# Patient Record
Sex: MALE | Race: OTHER | Employment: OTHER | URBAN - METROPOLITAN AREA
[De-identification: names, ages, dates, MRNs, and addresses within clinical notes are randomized per-mention and may not be internally consistent; named-entity substitution may affect disease eponyms.]

---

## 2017-01-19 ENCOUNTER — OFFICE VISIT (OUTPATIENT)
Dept: INTERNAL MEDICINE CLINIC | Facility: CLINIC | Age: 67
End: 2017-01-19

## 2017-01-19 VITALS
RESPIRATION RATE: 16 BRPM | HEART RATE: 78 BPM | HEIGHT: 70 IN | WEIGHT: 164.13 LBS | DIASTOLIC BLOOD PRESSURE: 82 MMHG | BODY MASS INDEX: 23.5 KG/M2 | SYSTOLIC BLOOD PRESSURE: 118 MMHG

## 2017-01-19 DIAGNOSIS — Z12.5 SCREENING FOR PROSTATE CANCER: ICD-10-CM

## 2017-01-19 DIAGNOSIS — Z79.01 LONG TERM (CURRENT) USE OF ANTICOAGULANTS: ICD-10-CM

## 2017-01-19 DIAGNOSIS — E78.2 MIXED HYPERLIPIDEMIA: ICD-10-CM

## 2017-01-19 DIAGNOSIS — I42.9 CARDIOMYOPATHY, UNSPECIFIED TYPE (HCC): ICD-10-CM

## 2017-01-19 DIAGNOSIS — Z00.00 MEDICARE ANNUAL WELLNESS VISIT, SUBSEQUENT: Primary | ICD-10-CM

## 2017-01-19 DIAGNOSIS — I48.92 ATRIAL FLUTTER, UNSPECIFIED TYPE (HCC): ICD-10-CM

## 2017-01-19 DIAGNOSIS — Z13.31 DEPRESSION SCREENING: ICD-10-CM

## 2017-01-19 PROCEDURE — G0444 DEPRESSION SCREEN ANNUAL: HCPCS | Performed by: INTERNAL MEDICINE

## 2017-01-19 PROCEDURE — G0102 PROSTATE CA SCREENING; DRE: HCPCS | Performed by: INTERNAL MEDICINE

## 2017-01-19 PROCEDURE — 99214 OFFICE O/P EST MOD 30 MIN: CPT | Performed by: INTERNAL MEDICINE

## 2017-01-19 PROCEDURE — 96160 PT-FOCUSED HLTH RISK ASSMT: CPT | Performed by: INTERNAL MEDICINE

## 2017-01-19 PROCEDURE — G0463 HOSPITAL OUTPT CLINIC VISIT: HCPCS | Performed by: INTERNAL MEDICINE

## 2017-01-19 NOTE — PROGRESS NOTES
HPI:   Laura Esquivel is a 74year old male who presents for a Medicare Subsequent Annual Wellness visit (Pt already had Initial Annual Wellness).     Annual Physical due on 01/19/2018        Patient Care Team: Patient Care Team:  Zeeshan Jones MD Screening Method:  Questionnaire      I have a problem hearing over the telephone:  No I have trouble following   the conversations when two or more people are talking at the same time:    No   I have trouble understanding things on the TV:  No I have to Pneumococcal?: No     Functional Ability     Bathing or Showering: Able without help    Toileting: Able without help    Dressing: Able without help    Eating: Able without help    Driving: Able without help    Preparing your meals: Able without help    Man patient  PREVENTATIVE SERVICES  INDICATIONS AND SCHEDULE Internal Lab or Procedure External Lab or Procedure   Diabetes Screening      HbgA1C   Annually GLYCOHEMOGLOBIN (HGA1C) (L) (%)   Date Value   06/06/2015 5.8       No flowsheet data found.     Fasting Drug Serum Conc  Annually No results found for: DIGOXIN, DIG, VALP No flowsheet data found. Diabetes      HgbA1C  Annually GLYCOHEMOGLOBIN (HGA1C) (L) (%)   Date Value   06/06/2015 5.8       No flowsheet data found.     Creat/alb ratio  Annually psychosocial issues. Risk factors for coronary artery disease include male sex. Review of Systems   Constitutional: Negative for fever, chills and unexpected weight change. HENT: Negative for congestion, ear pain and postnasal drip.     Eyes: Negat Soft. Bowel sounds are normal. There is no tenderness. There is no guarding. Genitourinary: Rectum normal, prostate normal, testes normal and penis normal. Rectal exam shows no mass, no tenderness and anal tone normal. Prostate is not tender.  Right testi hyperlipidemia  His last lipid panel done in January 2016 was very good, however he was taking Lipitor at that time. He stopped taking the Lipitor 2 months ago and he will not let me refill it at this time. He wants to do a blood test and see Dr. Tamie Loera.

## 2017-01-19 NOTE — PATIENT INSTRUCTIONS
Do fasting labs this weekend. Fast for 12 hours. Water is okay. Walk in. Recommended Websites for Advanced Directives    [email or web address]  An information packet, including necessary form from the [place]

## 2017-01-21 ENCOUNTER — LAB ENCOUNTER (OUTPATIENT)
Dept: LAB | Facility: HOSPITAL | Age: 67
End: 2017-01-21
Attending: INTERNAL MEDICINE
Payer: MEDICARE

## 2017-01-21 ENCOUNTER — PRIOR ORIGINAL RECORDS (OUTPATIENT)
Dept: OTHER | Age: 67
End: 2017-01-21

## 2017-01-21 DIAGNOSIS — I48.19 PERSISTENT ATRIAL FIBRILLATION (HCC): Primary | ICD-10-CM

## 2017-01-21 DIAGNOSIS — Z12.5 SCREENING FOR PROSTATE CANCER: ICD-10-CM

## 2017-01-21 DIAGNOSIS — I48.92 ATRIAL FLUTTER, UNSPECIFIED TYPE (HCC): ICD-10-CM

## 2017-01-21 DIAGNOSIS — E78.2 MIXED HYPERLIPIDEMIA: ICD-10-CM

## 2017-01-21 LAB
ALBUMIN SERPL BCP-MCNC: 3.9 G/DL (ref 3.5–4.8)
ALBUMIN/GLOB SERPL: 1.2 {RATIO} (ref 1–2)
ALP SERPL-CCNC: 60 U/L (ref 32–100)
ALT SERPL-CCNC: 19 U/L (ref 17–63)
ANION GAP SERPL CALC-SCNC: 6 MMOL/L (ref 0–18)
AST SERPL-CCNC: 25 U/L (ref 15–41)
BASOPHILS # BLD: 0.1 K/UL (ref 0–0.2)
BASOPHILS NFR BLD: 1 %
BILIRUB SERPL-MCNC: 1.5 MG/DL (ref 0.3–1.2)
BUN SERPL-MCNC: 27 MG/DL (ref 8–20)
BUN/CREAT SERPL: 27.3 (ref 10–20)
CALCIUM SERPL-MCNC: 9.5 MG/DL (ref 8.5–10.5)
CHLORIDE SERPL-SCNC: 107 MMOL/L (ref 95–110)
CHOLEST SERPL-MCNC: 164 MG/DL (ref 110–200)
CO2 SERPL-SCNC: 29 MMOL/L (ref 22–32)
CREAT SERPL-MCNC: 0.99 MG/DL (ref 0.5–1.5)
EOSINOPHIL # BLD: 0.3 K/UL (ref 0–0.7)
EOSINOPHIL NFR BLD: 7 %
ERYTHROCYTE [DISTWIDTH] IN BLOOD BY AUTOMATED COUNT: 15.8 % (ref 11–15)
GLOBULIN PLAS-MCNC: 3.2 G/DL (ref 2.5–3.7)
GLUCOSE SERPL-MCNC: 91 MG/DL (ref 70–99)
HCT VFR BLD AUTO: 49 % (ref 41–52)
HDLC SERPL-MCNC: 36 MG/DL
HGB BLD-MCNC: 16.2 G/DL (ref 13.5–17.5)
INR BLD: 2.3 (ref 0.9–1.2)
LDLC SERPL CALC-MCNC: 115 MG/DL (ref 0–99)
LYMPHOCYTES # BLD: 1.1 K/UL (ref 1–4)
LYMPHOCYTES NFR BLD: 23 %
MCH RBC QN AUTO: 32.1 PG (ref 27–32)
MCHC RBC AUTO-ENTMCNC: 33 G/DL (ref 32–37)
MCV RBC AUTO: 97.4 FL (ref 80–100)
MONOCYTES # BLD: 0.5 K/UL (ref 0–1)
MONOCYTES NFR BLD: 9 %
NEUTROPHILS # BLD AUTO: 2.8 K/UL (ref 1.8–7.7)
NEUTROPHILS NFR BLD: 59 %
NONHDLC SERPL-MCNC: 128 MG/DL
OSMOLALITY UR CALC.SUM OF ELEC: 299 MOSM/KG (ref 275–295)
PLATELET # BLD AUTO: 190 K/UL (ref 140–400)
PMV BLD AUTO: 11 FL (ref 7.4–10.3)
POTASSIUM SERPL-SCNC: 4.4 MMOL/L (ref 3.3–5.1)
PROT SERPL-MCNC: 7.1 G/DL (ref 5.9–8.4)
PROTHROMBIN TIME: 25.2 SECONDS (ref 11.8–14.5)
PSA SERPL-MCNC: 1.5 NG/ML (ref 0–4)
RBC # BLD AUTO: 5.03 M/UL (ref 4.5–5.9)
SODIUM SERPL-SCNC: 142 MMOL/L (ref 136–144)
TRIGL SERPL-MCNC: 63 MG/DL (ref 1–149)
TSH SERPL-ACNC: 0.86 UIU/ML (ref 0.34–5.6)
WBC # BLD AUTO: 4.8 K/UL (ref 4–11)

## 2017-01-21 PROCEDURE — 36415 COLL VENOUS BLD VENIPUNCTURE: CPT

## 2017-01-21 PROCEDURE — 85610 PROTHROMBIN TIME: CPT

## 2017-01-21 PROCEDURE — 80061 LIPID PANEL: CPT

## 2017-01-21 PROCEDURE — 85025 COMPLETE CBC W/AUTO DIFF WBC: CPT

## 2017-01-21 PROCEDURE — 84443 ASSAY THYROID STIM HORMONE: CPT

## 2017-01-21 PROCEDURE — 80053 COMPREHEN METABOLIC PANEL: CPT

## 2017-01-25 PROBLEM — Z79.01 LONG TERM (CURRENT) USE OF ANTICOAGULANTS: Status: ACTIVE | Noted: 2017-01-25

## 2017-03-28 LAB
ALBUMIN: 3.9 G/DL
ALT (SGPT): 19 U/L
ALT (SGPT): 19 U/L
AST (SGOT): 25 U/L
AST (SGOT): 25 U/L
BILIRUBIN TOTAL: 1.5 MG/DL
BILIRUBIN TOTAL: 1.5 MG/DL
BUN: 27 MG/DL
CALCIUM: 9.5 MG/DL
CHLORIDE: 107 MEQ/L
CHOLESTEROL, TOTAL: 164 MG/DL
CHOLESTEROL, TOTAL: 164 MG/DL
CREATININE, SERUM: 0.99 MG/DL
GLOBULIN: 3.2 G/DL
GLUCOSE: 91 MG/DL
HDL CHOLESTEROL: 36 MG/DL
HDL CHOLESTEROL: 36 MG/DL
HEMATOCRIT: 49 %
HEMOGLOBIN: 16.2 G/DL
LDL CHOLESTEROL: 115 MG/DL
LDL CHOLESTEROL: 115 MG/DL
MCH: 32.1 PG
MCHC: 33 G/DL
MCV: 97.4 FL
NON-HDL CHOLESTEROL: 128 MG/DL
NON-HDL CHOLESTEROL: 128 MG/DL
PLATELETS: 190 K/UL
POTASSIUM, SERUM: 4.4 MEQ/L
PROTEIN, TOTAL: 7.1 G/DL
RED BLOOD COUNT: 5.03 X 10-6/U
SGOT (AST): 25 IU/L
SGPT (ALT): 19 IU/L
SODIUM: 142 MEQ/L
THYROID STIMULATING HORMONE: 0.86 MLU/L
TRIGLYCERIDES: 63 MG/DL
TRIGLYCERIDES: 63 MG/DL
WHITE BLOOD COUNT: 4.8 X 10-3/U

## 2017-04-03 ENCOUNTER — APPOINTMENT (OUTPATIENT)
Dept: LAB | Age: 67
End: 2017-04-03
Attending: INTERNAL MEDICINE
Payer: MEDICARE

## 2017-04-03 DIAGNOSIS — I48.92 ATRIAL FLUTTER, UNSPECIFIED TYPE (HCC): ICD-10-CM

## 2017-04-03 DIAGNOSIS — E78.2 MIXED HYPERLIPIDEMIA: ICD-10-CM

## 2017-04-03 DIAGNOSIS — I48.19 PERSISTENT ATRIAL FIBRILLATION (HCC): ICD-10-CM

## 2017-04-03 DIAGNOSIS — Z12.5 SCREENING FOR PROSTATE CANCER: ICD-10-CM

## 2017-04-03 PROCEDURE — 85610 PROTHROMBIN TIME: CPT

## 2017-04-03 PROCEDURE — 36415 COLL VENOUS BLD VENIPUNCTURE: CPT

## 2017-04-04 ENCOUNTER — PRIOR ORIGINAL RECORDS (OUTPATIENT)
Dept: OTHER | Age: 67
End: 2017-04-04

## 2017-05-24 ENCOUNTER — APPOINTMENT (OUTPATIENT)
Dept: LAB | Age: 67
End: 2017-05-24
Attending: INTERNAL MEDICINE
Payer: MEDICARE

## 2017-05-24 DIAGNOSIS — I48.19 PERSISTENT ATRIAL FIBRILLATION (HCC): ICD-10-CM

## 2017-05-24 DIAGNOSIS — I48.92 ATRIAL FLUTTER, UNSPECIFIED TYPE (HCC): ICD-10-CM

## 2017-05-24 DIAGNOSIS — Z12.5 SCREENING FOR PROSTATE CANCER: ICD-10-CM

## 2017-05-24 DIAGNOSIS — E78.2 MIXED HYPERLIPIDEMIA: ICD-10-CM

## 2017-05-24 PROCEDURE — 36415 COLL VENOUS BLD VENIPUNCTURE: CPT

## 2017-05-24 PROCEDURE — 85610 PROTHROMBIN TIME: CPT

## 2017-07-11 ENCOUNTER — APPOINTMENT (OUTPATIENT)
Dept: LAB | Age: 67
End: 2017-07-11
Attending: INTERNAL MEDICINE
Payer: MEDICARE

## 2017-07-11 DIAGNOSIS — Z12.5 SCREENING FOR PROSTATE CANCER: ICD-10-CM

## 2017-07-11 DIAGNOSIS — I48.19 PERSISTENT ATRIAL FIBRILLATION (HCC): ICD-10-CM

## 2017-07-11 DIAGNOSIS — E78.2 MIXED HYPERLIPIDEMIA: ICD-10-CM

## 2017-07-11 DIAGNOSIS — I48.92 ATRIAL FLUTTER, UNSPECIFIED TYPE (HCC): ICD-10-CM

## 2017-07-11 LAB
INR BLD: 2.2 (ref 0.9–1.2)
PROTHROMBIN TIME: 24.2 SECONDS (ref 11.8–14.5)

## 2017-07-11 PROCEDURE — 36415 COLL VENOUS BLD VENIPUNCTURE: CPT

## 2017-07-11 PROCEDURE — 85610 PROTHROMBIN TIME: CPT

## 2017-08-25 ENCOUNTER — HOSPITAL ENCOUNTER (OUTPATIENT)
Dept: CARDIOLOGY CLINIC | Age: 67
Discharge: HOME | End: 2017-08-25
Attending: INTERNAL MEDICINE
Payer: MEDICARE

## 2017-08-25 DIAGNOSIS — I42.9 CARDIOMYOPATHY, UNSPECIFIED TYPE (HCC): ICD-10-CM

## 2017-08-25 PROCEDURE — 93306 TTE W/DOPPLER COMPLETE: CPT | Performed by: INTERNAL MEDICINE

## 2017-08-28 ENCOUNTER — OFFICE VISIT (OUTPATIENT)
Dept: INTERNAL MEDICINE CLINIC | Facility: CLINIC | Age: 67
End: 2017-08-28

## 2017-08-28 VITALS
HEIGHT: 69.5 IN | WEIGHT: 161.81 LBS | OXYGEN SATURATION: 97 % | BODY MASS INDEX: 23.43 KG/M2 | RESPIRATION RATE: 18 BRPM | TEMPERATURE: 98 F | HEART RATE: 84 BPM | SYSTOLIC BLOOD PRESSURE: 122 MMHG | DIASTOLIC BLOOD PRESSURE: 80 MMHG

## 2017-08-28 DIAGNOSIS — Z23 NEED FOR PNEUMOCOCCAL VACCINE: ICD-10-CM

## 2017-08-28 DIAGNOSIS — I48.91 ATRIAL FIBRILLATION AND FLUTTER (HCC): ICD-10-CM

## 2017-08-28 DIAGNOSIS — E78.00 PURE HYPERCHOLESTEROLEMIA: Primary | ICD-10-CM

## 2017-08-28 DIAGNOSIS — I48.92 ATRIAL FIBRILLATION AND FLUTTER (HCC): ICD-10-CM

## 2017-08-28 DIAGNOSIS — Z12.5 SCREENING FOR PROSTATE CANCER: ICD-10-CM

## 2017-08-28 PROCEDURE — 99204 OFFICE O/P NEW MOD 45 MIN: CPT | Performed by: INTERNAL MEDICINE

## 2017-08-28 NOTE — PROGRESS NOTES
HPI:    Patient ID: Devon Webb is a 74year old male. HPIPt here for a get established as a new pt. Has a fib and hx of stroke a few years. Needs blood work orders. Review of Systems   Constitutional: Negative for unexpected weight change.

## 2017-08-29 ENCOUNTER — PRIOR ORIGINAL RECORDS (OUTPATIENT)
Dept: OTHER | Age: 67
End: 2017-08-29

## 2017-08-30 ENCOUNTER — PRIOR ORIGINAL RECORDS (OUTPATIENT)
Dept: OTHER | Age: 67
End: 2017-08-30

## 2017-08-30 ENCOUNTER — LAB ENCOUNTER (OUTPATIENT)
Dept: LAB | Age: 67
End: 2017-08-30
Attending: INTERNAL MEDICINE
Payer: MEDICARE

## 2017-08-30 DIAGNOSIS — I48.92 ATRIAL FIBRILLATION AND FLUTTER (HCC): ICD-10-CM

## 2017-08-30 DIAGNOSIS — I48.91 ATRIAL FIBRILLATION AND FLUTTER (HCC): ICD-10-CM

## 2017-08-30 DIAGNOSIS — Z12.5 SCREENING FOR PROSTATE CANCER: ICD-10-CM

## 2017-08-30 DIAGNOSIS — E78.00 PURE HYPERCHOLESTEROLEMIA: ICD-10-CM

## 2017-08-30 LAB
ALBUMIN SERPL BCP-MCNC: 4.2 G/DL (ref 3.5–4.8)
ALBUMIN/GLOB SERPL: 1.4 {RATIO} (ref 1–2)
ALP SERPL-CCNC: 62 U/L (ref 32–100)
ALT SERPL-CCNC: 19 U/L (ref 17–63)
ANION GAP SERPL CALC-SCNC: 7 MMOL/L (ref 0–18)
AST SERPL-CCNC: 26 U/L (ref 15–41)
BASOPHILS # BLD: 0 K/UL (ref 0–0.2)
BASOPHILS NFR BLD: 1 %
BILIRUB SERPL-MCNC: 2.2 MG/DL (ref 0.3–1.2)
BUN SERPL-MCNC: 20 MG/DL (ref 8–20)
BUN/CREAT SERPL: 18.5 (ref 10–20)
CALCIUM SERPL-MCNC: 9.9 MG/DL (ref 8.5–10.5)
CHLORIDE SERPL-SCNC: 106 MMOL/L (ref 95–110)
CHOLEST SERPL-MCNC: 157 MG/DL (ref 110–200)
CO2 SERPL-SCNC: 29 MMOL/L (ref 22–32)
CREAT SERPL-MCNC: 1.08 MG/DL (ref 0.5–1.5)
EOSINOPHIL # BLD: 0.4 K/UL (ref 0–0.7)
EOSINOPHIL NFR BLD: 6 %
ERYTHROCYTE [DISTWIDTH] IN BLOOD BY AUTOMATED COUNT: 16.1 % (ref 11–15)
GLOBULIN PLAS-MCNC: 3 G/DL (ref 2.5–3.7)
GLUCOSE SERPL-MCNC: 79 MG/DL (ref 70–99)
HCT VFR BLD AUTO: 52.4 % (ref 41–52)
HDLC SERPL-MCNC: 34 MG/DL
HGB BLD-MCNC: 17.4 G/DL (ref 13.5–17.5)
LDLC SERPL CALC-MCNC: 106 MG/DL (ref 0–99)
LYMPHOCYTES # BLD: 1.6 K/UL (ref 1–4)
LYMPHOCYTES NFR BLD: 25 %
MCH RBC QN AUTO: 32 PG (ref 27–32)
MCHC RBC AUTO-ENTMCNC: 33.1 G/DL (ref 32–37)
MCV RBC AUTO: 96.6 FL (ref 80–100)
MONOCYTES # BLD: 0.6 K/UL (ref 0–1)
MONOCYTES NFR BLD: 10 %
NEUTROPHILS # BLD AUTO: 3.7 K/UL (ref 1.8–7.7)
NEUTROPHILS NFR BLD: 59 %
NONHDLC SERPL-MCNC: 123 MG/DL
OSMOLALITY UR CALC.SUM OF ELEC: 296 MOSM/KG (ref 275–295)
PLATELET # BLD AUTO: 190 K/UL (ref 140–400)
PMV BLD AUTO: 10.4 FL (ref 7.4–10.3)
POTASSIUM SERPL-SCNC: 5.7 MMOL/L (ref 3.3–5.1)
PROT SERPL-MCNC: 7.2 G/DL (ref 5.9–8.4)
PSA SERPL-MCNC: 1.4 NG/ML (ref 0–4)
RBC # BLD AUTO: 5.42 M/UL (ref 4.5–5.9)
SODIUM SERPL-SCNC: 142 MMOL/L (ref 136–144)
TRIGL SERPL-MCNC: 86 MG/DL (ref 1–149)
WBC # BLD AUTO: 6.3 K/UL (ref 4–11)

## 2017-08-30 PROCEDURE — 80053 COMPREHEN METABOLIC PANEL: CPT

## 2017-08-30 PROCEDURE — 80061 LIPID PANEL: CPT

## 2017-08-30 PROCEDURE — 85025 COMPLETE CBC W/AUTO DIFF WBC: CPT

## 2017-08-30 PROCEDURE — 36415 COLL VENOUS BLD VENIPUNCTURE: CPT

## 2017-09-01 LAB
ALBUMIN: 4.2 G/DL
ALKALINE PHOSPHATATE(ALK PHOS): 62 IU/L
ALT (SGPT): 19 U/L
AST (SGOT): 26 U/L
BILIRUBIN TOTAL: 2.2 MG/DL
BUN: 20 MG/DL
CALCIUM: 9.9 MG/DL
CHLORIDE: 106 MEQ/L
CHOLESTEROL, TOTAL: 157 MG/DL
CREATININE, SERUM: 1.08 MG/DL
GLOBULIN: 3 G/DL
GLUCOSE: 79 MG/DL
GLUCOSE: 79 MG/DL
HDL CHOLESTEROL: 34 MG/DL
HEMATOCRIT: 52.4 %
HEMOGLOBIN: 17.4 G/DL
LDL CHOLESTEROL: 106 MG/DL
NON-HDL CHOLESTEROL: 123 MG/DL
PLATELETS: 190 K/UL
POTASSIUM, SERUM: 5.7 MEQ/L
PROTEIN, TOTAL: 7.2 G/DL
RED BLOOD COUNT: 5.42 X 10-6/U
SGOT (AST): 26 IU/L
SGPT (ALT): 19 IU/L
SODIUM: 142 MEQ/L
TRIGLYCERIDES: 86 MG/DL
WHITE BLOOD COUNT: 6.3 X 10-3/U

## 2017-09-05 ENCOUNTER — PRIOR ORIGINAL RECORDS (OUTPATIENT)
Dept: OTHER | Age: 67
End: 2017-09-05

## 2017-09-06 NOTE — PROGRESS NOTES
Pt notified normal rslts per Dr Josephine Hughes- released to [facility]

## 2017-10-18 ENCOUNTER — OFFICE VISIT (OUTPATIENT)
Dept: INTERNAL MEDICINE CLINIC | Facility: CLINIC | Age: 67
End: 2017-10-18

## 2017-10-18 VITALS
WEIGHT: 166.63 LBS | OXYGEN SATURATION: 97 % | DIASTOLIC BLOOD PRESSURE: 74 MMHG | SYSTOLIC BLOOD PRESSURE: 124 MMHG | HEART RATE: 66 BPM | BODY MASS INDEX: 24.12 KG/M2 | RESPIRATION RATE: 17 BRPM | TEMPERATURE: 98 F | HEIGHT: 69.5 IN

## 2017-10-18 DIAGNOSIS — I42.0 DILATED CARDIOMYOPATHY (HCC): ICD-10-CM

## 2017-10-18 DIAGNOSIS — Z23 INFLUENZA VACCINE NEEDED: Primary | ICD-10-CM

## 2017-10-18 DIAGNOSIS — I48.19 PERSISTENT ATRIAL FIBRILLATION (HCC): ICD-10-CM

## 2017-10-18 PROCEDURE — 99214 OFFICE O/P EST MOD 30 MIN: CPT | Performed by: INTERNAL MEDICINE

## 2017-10-18 RX ORDER — RIVAROXABAN 20 MG/1
20 TABLET, FILM COATED ORAL
COMMUNITY
Start: 2017-10-02

## 2017-10-18 RX ORDER — CARVEDILOL 6.25 MG/1
6.25 TABLET ORAL 2 TIMES DAILY WITH MEALS
COMMUNITY
Start: 2017-10-02 | End: 2018-08-29

## 2017-10-18 NOTE — PROGRESS NOTES
HPI:    Patient ID: Smitha Carson is a 74year old male. Pt here for a second opinion re  anticoagulatiion AND NEED FOR icd  Defibrillator. Has been placed on xarelto by dr. Ab Mnier.   Has no MARQUEZ or palpiations DANGER: NEURAXIAL ACCESS (EPIDURAL) 2 atrial fibrillation (hcc)on xarelto rx and Coreg  Dilated cardiomyopathy (hcc) currently compensated - discussed that chance of sudden death is greater with a low EF. Will plan on going with ICD.       Orders Placed This Encounter      Fluad 0.5 ml  65 yea

## 2017-11-30 ENCOUNTER — TELEPHONE (OUTPATIENT)
Dept: CASE MANAGEMENT | Age: 67
End: 2017-11-30

## 2017-11-30 NOTE — TELEPHONE ENCOUNTER
Outreached to patient in regards to scheduling Medicare Annual exam (AHA). Left message for patient to return my call at ext. [phone]. Patient is eligible in January 2018. Thank you.

## 2018-02-22 ENCOUNTER — TELEPHONE (OUTPATIENT)
Dept: INTERNAL MEDICINE CLINIC | Facility: CLINIC | Age: 68
End: 2018-02-22

## 2018-02-22 NOTE — TELEPHONE ENCOUNTER
Spoke to Mrs. Nagel and she advised her  is not home - she did not want to make an appt for her . She will Ez Geno call for an appointment.

## 2018-03-06 ENCOUNTER — TELEPHONE (OUTPATIENT)
Dept: INTERNAL MEDICINE CLINIC | Facility: CLINIC | Age: 68
End: 2018-03-06

## 2018-03-06 DIAGNOSIS — I48.92 ATRIAL FLUTTER, UNSPECIFIED TYPE (HCC): Primary | ICD-10-CM

## 2018-03-06 DIAGNOSIS — I42.9 CARDIOMYOPATHY, UNSPECIFIED TYPE (HCC): ICD-10-CM

## 2018-03-06 DIAGNOSIS — E78.2 MIXED HYPERLIPIDEMIA: ICD-10-CM

## 2018-03-06 DIAGNOSIS — Z79.01 LONG TERM (CURRENT) USE OF ANTICOAGULANTS: ICD-10-CM

## 2018-04-11 ENCOUNTER — OFFICE VISIT (OUTPATIENT)
Dept: INTERNAL MEDICINE CLINIC | Facility: CLINIC | Age: 68
End: 2018-04-11

## 2018-04-11 VITALS
HEIGHT: 69.5 IN | TEMPERATURE: 98 F | DIASTOLIC BLOOD PRESSURE: 64 MMHG | WEIGHT: 168 LBS | RESPIRATION RATE: 18 BRPM | OXYGEN SATURATION: 98 % | SYSTOLIC BLOOD PRESSURE: 124 MMHG | HEART RATE: 90 BPM | BODY MASS INDEX: 24.32 KG/M2

## 2018-04-11 DIAGNOSIS — Z00.00 MEDICARE ANNUAL WELLNESS VISIT, SUBSEQUENT: Primary | ICD-10-CM

## 2018-04-11 DIAGNOSIS — I48.19 PERSISTENT ATRIAL FIBRILLATION (HCC): ICD-10-CM

## 2018-04-11 DIAGNOSIS — E78.2 MIXED HYPERLIPIDEMIA: ICD-10-CM

## 2018-04-11 DIAGNOSIS — Z12.5 PROSTATE CANCER SCREENING: ICD-10-CM

## 2018-04-11 DIAGNOSIS — I42.0 DILATED CARDIOMYOPATHY (HCC): ICD-10-CM

## 2018-04-11 DIAGNOSIS — J30.1 SEASONAL ALLERGIC RHINITIS DUE TO POLLEN: ICD-10-CM

## 2018-04-11 PROCEDURE — G0439 PPPS, SUBSEQ VISIT: HCPCS | Performed by: INTERNAL MEDICINE

## 2018-04-11 PROCEDURE — 96160 PT-FOCUSED HLTH RISK ASSMT: CPT | Performed by: INTERNAL MEDICINE

## 2018-04-11 NOTE — PROGRESS NOTES
HPI:    Patient ID: Mey Pineda is a 69year old male. Pt here for a MA supervisit and fu on dilated cardiomyopathy and paroxysmal atrial fibrillation. Needs fu 2 decho and fu with cardiology.     HPI:   Mey Pineda is a 69year old ma last two weeks)?: Not at all    Feeling down, depressed, or hopeless (over the last two weeks)?: Not at all    PHQ-2 SCORE: 0        Advance Directives     Do you have a healthcare power of ?: No    Do you have a living will?: No   Was Medicare Ass palpitaion  A fib  GI: denies abdominal pain, denies heartburn  : 1 per night nocturia, no complaint of urinary incontinence  MUSCULOSKELETAL: denies back pain  NEURO: denies headaches  PSYCHE: denies depression or anxiety  HEMATOLOGIC: denies hx of anem PLATELET; Future    Mixed hyperlipidemia  -     COMP METABOLIC PANEL (14); Future  -     LIPID PANEL;  Future    Prostate cancer screening  -     PSA SCREEN; Future    Seasonal allergic rhinitis due to pollen         The patient indicates understanding of t anticonvulsants.)    Potassium  Annually Potassium (mmol/L)   Date Value   08/30/2017 5.7 (H)     POTASSIUM (P) (mmol/L)   Date Value   01/23/2016 5.4 (H)    No flowsheet data found.     Creatinine  Annually Creatinine (mg/dL)   Date Value   08/30/2017 1.08 rate control    Orders Placed This Encounter      Comp Metabolic Panel (14) [E]      Lipid Panel [E]      CBC W Differential W Platelet [E]      PSA (Screening) [E]    Meds This Visit:  No prescriptions requested or ordered in this encounter    Imaging & R

## 2018-04-14 ENCOUNTER — LAB ENCOUNTER (OUTPATIENT)
Dept: LAB | Age: 68
End: 2018-04-14
Attending: INTERNAL MEDICINE
Payer: MEDICARE

## 2018-04-14 ENCOUNTER — PRIOR ORIGINAL RECORDS (OUTPATIENT)
Dept: OTHER | Age: 68
End: 2018-04-14

## 2018-04-14 DIAGNOSIS — Z12.5 PROSTATE CANCER SCREENING: ICD-10-CM

## 2018-04-14 DIAGNOSIS — Z00.00 MEDICARE ANNUAL WELLNESS VISIT, SUBSEQUENT: ICD-10-CM

## 2018-04-14 DIAGNOSIS — E78.2 MIXED HYPERLIPIDEMIA: ICD-10-CM

## 2018-04-14 PROCEDURE — 85025 COMPLETE CBC W/AUTO DIFF WBC: CPT

## 2018-04-14 PROCEDURE — 80053 COMPREHEN METABOLIC PANEL: CPT

## 2018-04-14 PROCEDURE — 80061 LIPID PANEL: CPT

## 2018-04-14 PROCEDURE — 36415 COLL VENOUS BLD VENIPUNCTURE: CPT

## 2018-04-16 LAB
ALT (SGPT): 19 U/L
AST (SGOT): 23 U/L
BILIRUBIN TOTAL: 1.7 MG/DL
BUN: 20 MG/DL
CALCIUM: 9.7 MG/DL
CHLORIDE: 105 MEQ/L
CHOLESTEROL, TOTAL: 157 MG/DL
CREATININE, SERUM: 0.97 MG/DL
GLUCOSE: 89 MG/DL
GLUCOSE: 89 MG/DL
HDL CHOLESTEROL: 37 MG/DL
HEMATOCRIT: 52.9 %
HEMOGLOBIN: 17.3 G/DL
LDL CHOLESTEROL: 103 MG/DL
NON-HDL CHOLESTEROL: 120 MG/DL
PLATELETS: 189 K/UL
POTASSIUM, SERUM: 4.6 MEQ/L
PROTEIN, TOTAL: 7.2 G/DL
RED BLOOD COUNT: 5.43 X 10-6/U
SGOT (AST): 23 IU/L
SGPT (ALT): 19 IU/L
SODIUM: 139 MEQ/L
TRIGLYCERIDES: 87 MG/DL
WHITE BLOOD COUNT: 7.8 X 10-3/U

## 2018-04-18 ENCOUNTER — HOSPITAL ENCOUNTER (OUTPATIENT)
Dept: CARDIOLOGY CLINIC | Age: 68
Discharge: HOME | End: 2018-04-18
Attending: INTERNAL MEDICINE
Payer: MEDICARE

## 2018-04-18 DIAGNOSIS — I42.0 DILATED CARDIOMYOPATHY (HCC): ICD-10-CM

## 2018-04-18 PROCEDURE — 93306 TTE W/DOPPLER COMPLETE: CPT | Performed by: INTERNAL MEDICINE

## 2018-04-20 ENCOUNTER — PRIOR ORIGINAL RECORDS (OUTPATIENT)
Dept: OTHER | Age: 68
End: 2018-04-20

## 2018-05-04 ENCOUNTER — PRIOR ORIGINAL RECORDS (OUTPATIENT)
Dept: OTHER | Age: 68
End: 2018-05-04

## 2018-05-04 ENCOUNTER — HOSPITAL ENCOUNTER (OUTPATIENT)
Dept: GENERAL RADIOLOGY | Age: 68
Discharge: HOME | End: 2018-05-04
Attending: INTERNAL MEDICINE
Payer: MEDICARE

## 2018-05-04 ENCOUNTER — LAB ENCOUNTER (OUTPATIENT)
Dept: LAB | Age: 68
End: 2018-05-04
Attending: INTERNAL MEDICINE
Payer: MEDICARE

## 2018-05-04 DIAGNOSIS — I42.9 CARDIOMYOPATHY, UNSPECIFIED TYPE (HCC): ICD-10-CM

## 2018-05-04 DIAGNOSIS — Z01.810 PRE-OPERATIVE CARDIOVASCULAR EXAMINATION: ICD-10-CM

## 2018-05-04 PROCEDURE — 80048 BASIC METABOLIC PNL TOTAL CA: CPT

## 2018-05-04 PROCEDURE — 87641 MR-STAPH DNA AMP PROBE: CPT

## 2018-05-04 PROCEDURE — 71046 X-RAY EXAM CHEST 2 VIEWS: CPT | Performed by: INTERNAL MEDICINE

## 2018-05-04 PROCEDURE — 36415 COLL VENOUS BLD VENIPUNCTURE: CPT

## 2018-05-04 PROCEDURE — 85025 COMPLETE CBC W/AUTO DIFF WBC: CPT

## 2018-05-07 LAB
BUN: 24 MG/DL
CALCIUM: 9.1 MG/DL
CHLORIDE: 105 MEQ/L
CREATININE, SERUM: 0.92 MG/DL
GLUCOSE: 97 MG/DL
HEMATOCRIT: 49.2 %
HEMOGLOBIN: 16.2 G/DL
PLATELETS: 187 K/UL
POTASSIUM, SERUM: 4.8 MEQ/L
RED BLOOD COUNT: 5.02 X 10-6/U
SODIUM: 140 MEQ/L
WHITE BLOOD COUNT: 5 X 10-3/U

## 2018-05-09 ENCOUNTER — HOSPITAL ENCOUNTER (OUTPATIENT)
Dept: INTERVENTIONAL RADIOLOGY/VASCULAR | Facility: HOSPITAL | Age: 68
Discharge: HOME | End: 2018-05-10
Attending: INTERNAL MEDICINE | Admitting: INTERNAL MEDICINE
Payer: MEDICARE

## 2018-05-09 ENCOUNTER — APPOINTMENT (OUTPATIENT)
Dept: GENERAL RADIOLOGY | Facility: HOSPITAL | Age: 68
End: 2018-05-09
Attending: INTERNAL MEDICINE
Payer: MEDICARE

## 2018-05-09 DIAGNOSIS — I50.9 HF (HEART FAILURE) (HCC): ICD-10-CM

## 2018-05-09 PROCEDURE — 99152 MOD SED SAME PHYS/QHP 5/>YRS: CPT

## 2018-05-09 PROCEDURE — 02HK3KZ INSERTION OF DEFIBRILLATOR LEAD INTO RIGHT VENTRICLE, PERCUTANEOUS APPROACH: ICD-10-PCS | Performed by: INTERNAL MEDICINE

## 2018-05-09 PROCEDURE — 33249 INSJ/RPLCMT DEFIB W/LEAD(S): CPT

## 2018-05-09 PROCEDURE — 99153 MOD SED SAME PHYS/QHP EA: CPT

## 2018-05-09 PROCEDURE — A4216 STERILE WATER/SALINE, 10 ML: HCPCS

## 2018-05-09 PROCEDURE — 0JH608Z INSERTION OF DEFIBRILLATOR GENERATOR INTO CHEST SUBCUTANEOUS TISSUE AND FASCIA, OPEN APPROACH: ICD-10-PCS | Performed by: INTERNAL MEDICINE

## 2018-05-09 PROCEDURE — 71045 X-RAY EXAM CHEST 1 VIEW: CPT | Performed by: INTERNAL MEDICINE

## 2018-05-09 RX ORDER — LIDOCAINE HYDROCHLORIDE AND EPINEPHRINE 10; 10 MG/ML; UG/ML
INJECTION, SOLUTION INFILTRATION; PERINEURAL
Status: COMPLETED
Start: 2018-05-09 | End: 2018-05-09

## 2018-05-09 RX ORDER — ACETAMINOPHEN AND CODEINE PHOSPHATE 300; 30 MG/1; MG/1
2 TABLET ORAL EVERY 4 HOURS PRN
Status: DISCONTINUED | OUTPATIENT
Start: 2018-05-09 | End: 2018-05-10

## 2018-05-09 RX ORDER — SODIUM CHLORIDE 9 MG/ML
INJECTION, SOLUTION INTRAVENOUS
Status: COMPLETED
Start: 2018-05-09 | End: 2018-05-09

## 2018-05-09 RX ORDER — MIDAZOLAM HYDROCHLORIDE 1 MG/ML
INJECTION INTRAMUSCULAR; INTRAVENOUS
Status: COMPLETED
Start: 2018-05-09 | End: 2018-05-09

## 2018-05-09 RX ORDER — 0.9 % SODIUM CHLORIDE 0.9 %
VIAL (ML) INJECTION
Status: COMPLETED
Start: 2018-05-09 | End: 2018-05-09

## 2018-05-09 RX ORDER — ACETAMINOPHEN AND CODEINE PHOSPHATE 300; 30 MG/1; MG/1
1 TABLET ORAL EVERY 4 HOURS PRN
Status: DISCONTINUED | OUTPATIENT
Start: 2018-05-09 | End: 2018-05-10

## 2018-05-09 RX ORDER — CARVEDILOL 6.25 MG/1
6.25 TABLET ORAL 2 TIMES DAILY WITH MEALS
Status: DISCONTINUED | OUTPATIENT
Start: 2018-05-09 | End: 2018-05-10

## 2018-05-09 RX ORDER — SODIUM CHLORIDE 9 MG/ML
INJECTION, SOLUTION INTRAVENOUS ONCE
Status: COMPLETED | OUTPATIENT
Start: 2018-05-09 | End: 2018-05-09

## 2018-05-09 RX ORDER — LISINOPRIL 2.5 MG/1
2.5 TABLET ORAL DAILY
Status: DISCONTINUED | OUTPATIENT
Start: 2018-05-10 | End: 2018-05-10

## 2018-05-09 RX ORDER — CEFAZOLIN SODIUM/WATER 2 G/20 ML
SYRINGE (ML) INTRAVENOUS
Status: COMPLETED
Start: 2018-05-09 | End: 2018-05-09

## 2018-05-09 RX ORDER — CEFAZOLIN SODIUM/WATER 2 G/20 ML
2 SYRINGE (ML) INTRAVENOUS EVERY 8 HOURS
Status: COMPLETED | OUTPATIENT
Start: 2018-05-09 | End: 2018-05-09

## 2018-05-09 RX ORDER — ONDANSETRON 2 MG/ML
4 INJECTION INTRAMUSCULAR; INTRAVENOUS EVERY 6 HOURS PRN
Status: DISCONTINUED | OUTPATIENT
Start: 2018-05-09 | End: 2018-05-10

## 2018-05-09 RX ORDER — ACETAMINOPHEN 325 MG/1
650 TABLET ORAL EVERY 4 HOURS PRN
Status: DISCONTINUED | OUTPATIENT
Start: 2018-05-09 | End: 2018-05-10

## 2018-05-09 RX ORDER — BACITRACIN 50000 [USP'U]/1
INJECTION, POWDER, LYOPHILIZED, FOR SOLUTION INTRAMUSCULAR
Status: COMPLETED
Start: 2018-05-09 | End: 2018-05-09

## 2018-05-09 RX ADMIN — 0.9 % SODIUM CHLORIDE 10 ML: 0.9 % VIAL (ML) INJECTION at 20:28:00

## 2018-05-09 RX ADMIN — ACETAMINOPHEN AND CODEINE PHOSPHATE 2 TABLET: 300; 30 TABLET ORAL at 17:54:00

## 2018-05-09 RX ADMIN — CEFAZOLIN SODIUM/WATER 2 G: 2 G/20 ML SYRINGE (ML) INTRAVENOUS at 13:39:00

## 2018-05-09 RX ADMIN — CARVEDILOL 6.25 MG: 6.25 TABLET ORAL at 17:54:00

## 2018-05-09 RX ADMIN — ACETAMINOPHEN AND CODEINE PHOSPHATE 2 TABLET: 300; 30 TABLET ORAL at 13:47:00

## 2018-05-09 RX ADMIN — SODIUM CHLORIDE: 9 INJECTION, SOLUTION INTRAVENOUS at 09:15:00

## 2018-05-09 RX ADMIN — CEFAZOLIN SODIUM/WATER 2 G: 2 G/20 ML SYRINGE (ML) INTRAVENOUS at 20:20:00

## 2018-05-09 NOTE — PROCEDURES
OPERATION(S) PERFORMED:   1. Single chamber ICD implant. SJM   2. Chest fluoroscopy. 3. ICD testing. : To Light MD    INDICATION:  CM, EF <35% greater than 90days medial treatment. No CAD. NYHA II CHF. Ventricular tachycardia.     SEDATION: for details. MEASURED DATA: RV lead: has normal impedence, sensing and thresholds. CONCLUSIONS:   1. Status post successful implant of a single chamber ICD with active fixation RV lead with successful pacing and sensing parameters.    2. Successful I

## 2018-05-10 ENCOUNTER — APPOINTMENT (OUTPATIENT)
Dept: GENERAL RADIOLOGY | Facility: HOSPITAL | Age: 68
End: 2018-05-10
Attending: INTERNAL MEDICINE
Payer: MEDICARE

## 2018-05-10 VITALS
BODY MASS INDEX: 23.33 KG/M2 | DIASTOLIC BLOOD PRESSURE: 92 MMHG | SYSTOLIC BLOOD PRESSURE: 156 MMHG | HEIGHT: 70 IN | WEIGHT: 162.94 LBS | HEART RATE: 83 BPM | TEMPERATURE: 97 F | RESPIRATION RATE: 20 BRPM | OXYGEN SATURATION: 94 %

## 2018-05-10 PROCEDURE — 71046 X-RAY EXAM CHEST 2 VIEWS: CPT | Performed by: INTERNAL MEDICINE

## 2018-05-10 RX ORDER — ACETAMINOPHEN 325 MG/1
650 TABLET ORAL EVERY 4 HOURS PRN
Qty: 30 TABLET | Refills: 0 | Status: SHIPPED | OUTPATIENT
Start: 2018-05-10 | End: 2018-09-26

## 2018-05-10 RX ORDER — LISINOPRIL 2.5 MG/1
2.5 TABLET ORAL DAILY
Qty: 30 TABLET | Refills: 5 | Status: ON HOLD | OUTPATIENT
Start: 2018-05-11 | End: 2018-06-11

## 2018-05-10 RX ADMIN — LISINOPRIL 2.5 MG: 2.5 TABLET ORAL at 08:31:00

## 2018-05-10 RX ADMIN — CARVEDILOL 6.25 MG: 6.25 TABLET ORAL at 08:31:00

## 2018-05-10 NOTE — ICD/PM
S Discharge Instructions Pacemaker / ICD Generator     1. c Call the [address] [place] [address] office at [phone] to make an appointment for a           wound check in 7-10 days.    OR     c Your appointment for a wound check is on _______  with _____________ at

## 2018-05-10 NOTE — PROGRESS NOTES
[facility] Heart Cardiology Progress Note      Ronita Lab Patient Status:  Outpatient in a Bed    10/24/1950 MRN [id number]   Location [address] [place] [address] Attending Fay Gould MD   Hosp Day # 0 PCP Francisco Muro approximated, steri-strips intact with mepelex. Scheduled Meds:   • carvedilol  6.25 mg Oral BID with meals   • lisinopril  2.5 mg Oral Daily       TELE:  afib 80's.      Results:     Recent Labs   Lab  05/04/18   1454   GLU  97   BUN  24*   CREATSERUM

## 2018-05-17 ENCOUNTER — MYAURORA ACCOUNT LINK (OUTPATIENT)
Dept: OTHER | Age: 68
End: 2018-05-17

## 2018-05-22 ENCOUNTER — PRIOR ORIGINAL RECORDS (OUTPATIENT)
Dept: OTHER | Age: 68
End: 2018-05-22

## 2018-06-08 ENCOUNTER — PRIOR ORIGINAL RECORDS (OUTPATIENT)
Dept: OTHER | Age: 68
End: 2018-06-08

## 2018-06-08 ENCOUNTER — APPOINTMENT (OUTPATIENT)
Dept: LAB | Age: 68
End: 2018-06-08
Attending: INTERNAL MEDICINE
Payer: MEDICARE

## 2018-06-08 DIAGNOSIS — I48.91 A-FIB (HCC): ICD-10-CM

## 2018-06-08 PROCEDURE — 80048 BASIC METABOLIC PNL TOTAL CA: CPT

## 2018-06-08 PROCEDURE — 36415 COLL VENOUS BLD VENIPUNCTURE: CPT

## 2018-06-11 ENCOUNTER — HOSPITAL ENCOUNTER (OUTPATIENT)
Dept: INTERVENTIONAL RADIOLOGY/VASCULAR | Facility: HOSPITAL | Age: 68
Discharge: HOME | End: 2018-06-11
Attending: INTERNAL MEDICINE | Admitting: INTERNAL MEDICINE
Payer: MEDICARE

## 2018-06-11 VITALS
OXYGEN SATURATION: 97 % | HEART RATE: 72 BPM | WEIGHT: 170 LBS | BODY MASS INDEX: 24.34 KG/M2 | RESPIRATION RATE: 20 BRPM | DIASTOLIC BLOOD PRESSURE: 89 MMHG | HEIGHT: 70 IN | SYSTOLIC BLOOD PRESSURE: 132 MMHG

## 2018-06-11 DIAGNOSIS — I48.91 A-FIB (HCC): Primary | ICD-10-CM

## 2018-06-11 DIAGNOSIS — I50.9 HF (HEART FAILURE) (HCC): ICD-10-CM

## 2018-06-11 LAB
BUN: 29 MG/DL
CALCIUM: 9.1 MG/DL
CHLORIDE: 103 MEQ/L
CREATININE, SERUM: 1.03 MG/DL
GLUCOSE: 100 MG/DL
POTASSIUM, SERUM: 4.1 MEQ/L
SODIUM: 138 MEQ/L

## 2018-06-11 PROCEDURE — 80048 BASIC METABOLIC PNL TOTAL CA: CPT | Performed by: INTERNAL MEDICINE

## 2018-06-11 PROCEDURE — 4B02XTZ MEASUREMENT OF CARDIAC DEFIBRILLATOR, EXTERNAL APPROACH: ICD-10-PCS | Performed by: INTERNAL MEDICINE

## 2018-06-11 PROCEDURE — 93642 EP EVL 1/2CHMB TRNSVNS CVDFB: CPT

## 2018-06-11 RX ORDER — SODIUM CHLORIDE 9 MG/ML
INJECTION, SOLUTION INTRAVENOUS CONTINUOUS
Status: DISCONTINUED | OUTPATIENT
Start: 2018-06-11 | End: 2018-06-11

## 2018-06-11 RX ORDER — LISINOPRIL 2.5 MG/1
2.5 TABLET ORAL DAILY
Qty: 30 TABLET | Refills: 5 | Status: SHIPPED | OUTPATIENT
Start: 2018-06-11 | End: 2018-10-26

## 2018-06-11 RX ORDER — SODIUM CHLORIDE 9 MG/ML
INJECTION, SOLUTION INTRAVENOUS
Status: DISCONTINUED
Start: 2018-06-11 | End: 2018-06-11

## 2018-06-11 RX ADMIN — SODIUM CHLORIDE: 9 INJECTION, SOLUTION INTRAVENOUS at 10:09:00

## 2018-06-11 NOTE — PROCEDURES
PROCEDURE(S) PERFORMED:    1. ICD induction testing. 2.    ICD  Interrogation. Programing     :  Willy Strickland MD     ANESTHESIA:  IV sedation.   Moderate conscious sedation for this procedure was administered and personally monitored from 12:04 u

## 2018-07-16 ENCOUNTER — PRIOR ORIGINAL RECORDS (OUTPATIENT)
Dept: OTHER | Age: 68
End: 2018-07-16

## 2018-08-14 ENCOUNTER — PRIOR ORIGINAL RECORDS (OUTPATIENT)
Dept: OTHER | Age: 68
End: 2018-08-14

## 2018-08-21 ENCOUNTER — PRIOR ORIGINAL RECORDS (OUTPATIENT)
Dept: OTHER | Age: 68
End: 2018-08-21

## 2018-08-29 ENCOUNTER — APPOINTMENT (OUTPATIENT)
Dept: GENERAL RADIOLOGY | Facility: HOSPITAL | Age: 68
End: 2018-08-29
Attending: EMERGENCY MEDICINE
Payer: MEDICARE

## 2018-08-29 ENCOUNTER — HOSPITAL ENCOUNTER (EMERGENCY)
Facility: HOSPITAL | Age: 68
Discharge: HOME | End: 2018-08-29
Attending: EMERGENCY MEDICINE
Payer: MEDICARE

## 2018-08-29 VITALS
HEIGHT: 68 IN | SYSTOLIC BLOOD PRESSURE: 135 MMHG | DIASTOLIC BLOOD PRESSURE: 75 MMHG | OXYGEN SATURATION: 94 % | BODY MASS INDEX: 24.25 KG/M2 | RESPIRATION RATE: 23 BRPM | HEART RATE: 85 BPM | WEIGHT: 160 LBS | TEMPERATURE: 98 F

## 2018-08-29 DIAGNOSIS — Z45.02 AICD DISCHARGE: ICD-10-CM

## 2018-08-29 DIAGNOSIS — I48.91 ATRIAL FIBRILLATION WITH RAPID VENTRICULAR RESPONSE (HCC): Primary | ICD-10-CM

## 2018-08-29 LAB
ANION GAP SERPL CALC-SCNC: 9 MMOL/L (ref 0–18)
BASOPHILS # BLD: 0.1 K/UL (ref 0–0.2)
BASOPHILS NFR BLD: 1 %
BUN SERPL-MCNC: 17 MG/DL (ref 8–20)
BUN/CREAT SERPL: 19.3 (ref 10–20)
CALCIUM SERPL-MCNC: 9.5 MG/DL (ref 8.5–10.5)
CHLORIDE SERPL-SCNC: 104 MMOL/L (ref 95–110)
CO2 SERPL-SCNC: 26 MMOL/L (ref 22–32)
CREAT SERPL-MCNC: 0.88 MG/DL (ref 0.5–1.5)
EOSINOPHIL # BLD: 0.5 K/UL (ref 0–0.7)
EOSINOPHIL NFR BLD: 7 %
ERYTHROCYTE [DISTWIDTH] IN BLOOD BY AUTOMATED COUNT: 17.1 % (ref 11–15)
GLUCOSE SERPL-MCNC: 109 MG/DL (ref 70–99)
HCT VFR BLD AUTO: 49.6 % (ref 41–52)
HGB BLD-MCNC: 16.5 G/DL (ref 13.5–17.5)
LYMPHOCYTES # BLD: 1.8 K/UL (ref 1–4)
LYMPHOCYTES NFR BLD: 28 %
MAGNESIUM SERPL-MCNC: 1.9 MG/DL (ref 1.8–2.5)
MCH RBC QN AUTO: 32.6 PG (ref 27–32)
MCHC RBC AUTO-ENTMCNC: 33.3 G/DL (ref 32–37)
MCV RBC AUTO: 98 FL (ref 80–100)
MONOCYTES # BLD: 0.7 K/UL (ref 0–1)
MONOCYTES NFR BLD: 10 %
NEUTROPHILS # BLD AUTO: 3.5 K/UL (ref 1.8–7.7)
NEUTROPHILS NFR BLD: 53 %
OSMOLALITY UR CALC.SUM OF ELEC: 290 MOSM/KG (ref 275–295)
PLATELET # BLD AUTO: 187 K/UL (ref 140–400)
PMV BLD AUTO: 10.6 FL (ref 7.4–10.3)
POTASSIUM SERPL-SCNC: 4.4 MMOL/L (ref 3.3–5.1)
RBC # BLD AUTO: 5.06 M/UL (ref 4.5–5.9)
SODIUM SERPL-SCNC: 139 MMOL/L (ref 136–144)
TROPONIN I SERPL-MCNC: 0 NG/ML (ref ?–0.03)
WBC # BLD AUTO: 6.5 K/UL (ref 4–11)

## 2018-08-29 PROCEDURE — 80048 BASIC METABOLIC PNL TOTAL CA: CPT | Performed by: EMERGENCY MEDICINE

## 2018-08-29 PROCEDURE — 93010 ELECTROCARDIOGRAM REPORT: CPT | Performed by: EMERGENCY MEDICINE

## 2018-08-29 PROCEDURE — 36415 COLL VENOUS BLD VENIPUNCTURE: CPT

## 2018-08-29 PROCEDURE — 71045 X-RAY EXAM CHEST 1 VIEW: CPT | Performed by: EMERGENCY MEDICINE

## 2018-08-29 PROCEDURE — 85025 COMPLETE CBC W/AUTO DIFF WBC: CPT | Performed by: EMERGENCY MEDICINE

## 2018-08-29 PROCEDURE — 85025 COMPLETE CBC W/AUTO DIFF WBC: CPT

## 2018-08-29 PROCEDURE — 84484 ASSAY OF TROPONIN QUANT: CPT | Performed by: EMERGENCY MEDICINE

## 2018-08-29 PROCEDURE — 80048 BASIC METABOLIC PNL TOTAL CA: CPT

## 2018-08-29 PROCEDURE — 84484 ASSAY OF TROPONIN QUANT: CPT

## 2018-08-29 PROCEDURE — 93005 ELECTROCARDIOGRAM TRACING: CPT

## 2018-08-29 PROCEDURE — 83735 ASSAY OF MAGNESIUM: CPT | Performed by: EMERGENCY MEDICINE

## 2018-08-29 PROCEDURE — 99285 EMERGENCY DEPT VISIT HI MDM: CPT

## 2018-08-29 RX ORDER — CARVEDILOL 6.25 MG/1
18.75 TABLET ORAL 2 TIMES DAILY WITH MEALS
Qty: 60 TABLET | Refills: 11 | Status: SHIPPED | COMMUNITY
Start: 2018-08-29 | End: 2018-09-26

## 2018-08-29 NOTE — ED PROVIDER NOTES
Patient Seen in: [facility] AND [facility] Emergency Department    History   Patient presents with:  Chest Pain Angina (cardiovascular)    Stated Complaint: shocked by aicd 2 days ago    HPI    Patient is a 26-year-old male who presents with dizziness ×1 week. cyanosis/clubbing/edema  Neuro: CN intact, normal speech, normal gait, 5/5 motor strength in all extremities, no focal deficits  SKIN: warm, dry, no rashes        ED Course     Labs Reviewed   BASIC METABOLIC PANEL (8) - Abnormal; Notable for the following episodes of atrial fib, some with HR >200. D/w MW heart. Dr. Chase Core in ED to evaluate patient at . Will increase rate for firing on AICD. Pt has f/u appointment. Rate remains stable in ED. Pt ambulatory in ED w/o difficulty.  Stable for discharge per

## 2018-08-29 NOTE — ED NOTES
Call received from [address] [place] [address] rep- pt shocked x3 on 8/23 for afib with rvr rate 200's.

## 2018-08-29 NOTE — TRANSITION NOTE
Lana Lassiter  : 10/24/1950  ACCOUNT:  [id number]  [phone]  PCP: Dr. Laura Deng     TODAY'S DATE: 2018  DICTATED BY:  [Dr. Belen Duvall: [Followup of Persistent atrial fibrillation.]    HPI:    [On 2018, Cristopher Sears ALCOHOL: drinks rarely. EXERCISE: minimal. DIET: low sodium diet. MARITAL STATUS: . OCCUPATION: retired.      ALLERGIES: No Known Allergies    MEDICATIONS: Selected prescriptions see below    VITAL SIGNS: [B/P - 132/90 , Pulse - 93, Weight -  165, He 6.25MG    1 TABLET TWICE DAILY AS DIRECTED. N     09/05/17 *Xarelto              20MG      1 TABLET DAILY.

## 2018-08-29 NOTE — ED NOTES
Pt presents to ED stating his pacemaker/defibrillator shocked him x3 last Thursday. Pt states he had it placed in May 2018 for hx of afib. Pt currently denies sob or cp. Pacemaker being interrogated at bedside.

## 2018-08-29 NOTE — CONSULTS
[facility]    Report of Cardiology Consultation    Polo Winsomekenny Patient Status:  Emergency    10/24/1950 MRN [id number]   Location [address] Attending Purnima Cast MD   Hosp Day # 0 PCP Zuleika Dinero Onset   • Lipids Mother    • Hypertension Mother    • Heart Disorder Father    • Diabetes Neg    • Cancer Neg    • Glaucoma Neg        Social History  Patient Guardian Status:  Not on file.     Other Topics            Concern  Caffeine Concern        Yes your patient.     Vitaly Burrell, [address] [facility] [address] [facility]  Cardiac Electrophysiology  8/29/2018

## 2018-08-29 NOTE — ED INITIAL ASSESSMENT (HPI)
Pt reports dizziness after being shocked by ICD 3 times on Thursday. Denies chest pain. + intermittent SOB.

## 2018-09-11 ENCOUNTER — PRIOR ORIGINAL RECORDS (OUTPATIENT)
Dept: OTHER | Age: 68
End: 2018-09-11

## 2018-09-26 ENCOUNTER — OFFICE VISIT (OUTPATIENT)
Dept: INTERNAL MEDICINE CLINIC | Facility: CLINIC | Age: 68
End: 2018-09-26

## 2018-09-26 VITALS
OXYGEN SATURATION: 98 % | HEIGHT: 70 IN | SYSTOLIC BLOOD PRESSURE: 142 MMHG | HEART RATE: 64 BPM | DIASTOLIC BLOOD PRESSURE: 94 MMHG | TEMPERATURE: 98 F | WEIGHT: 167 LBS | BODY MASS INDEX: 23.91 KG/M2

## 2018-09-26 DIAGNOSIS — Z23 NEED FOR INFLUENZA VACCINATION: Primary | ICD-10-CM

## 2018-09-26 DIAGNOSIS — T82.9XXS: ICD-10-CM

## 2018-09-26 DIAGNOSIS — I42.0 DILATED CARDIOMYOPATHY (HCC): ICD-10-CM

## 2018-09-26 PROCEDURE — 99214 OFFICE O/P EST MOD 30 MIN: CPT | Performed by: INTERNAL MEDICINE

## 2018-09-26 RX ORDER — CARVEDILOL 12.5 MG/1
TABLET ORAL
Refills: 2 | COMMUNITY
Start: 2018-09-14 | End: 2019-01-23

## 2018-09-26 NOTE — PROGRESS NOTES
HPI:    Patient ID: Katelyn Davies is a 69year old male. Pt here for fu on cardiomyopathy with recent complications of implantable defibrillator and complicaTIONS WITH A FIB.    Has had proposal of Av node ablation- but would like to have a second  [[id number]]      Meds This Visit:  Requested Prescriptions      No prescriptions requested or ordered in this encounter       Imaging & Referrals:  FLU VACCINE ADJUVANT IM  CARDIO - INTERNAL               Jenna Hines MD  9/26/2018

## 2018-10-09 ENCOUNTER — TELEPHONE (OUTPATIENT)
Dept: INTERNAL MEDICINE CLINIC | Facility: CLINIC | Age: 68
End: 2018-10-09

## 2018-10-09 DIAGNOSIS — T82.118S: Primary | ICD-10-CM

## 2018-10-09 NOTE — TELEPHONE ENCOUNTER
Pt notified - requested referral to dr. Dwaine Wong who is out of network - explained this to pt who insisits on a referral.

## 2018-10-17 ENCOUNTER — HOSPITAL ENCOUNTER (OUTPATIENT)
Dept: GENERAL RADIOLOGY | Age: 68
Discharge: HOME | End: 2018-10-17
Attending: INTERNAL MEDICINE
Payer: MEDICARE

## 2018-10-17 ENCOUNTER — TELEPHONE (OUTPATIENT)
Dept: INTERNAL MEDICINE CLINIC | Facility: CLINIC | Age: 68
End: 2018-10-17

## 2018-10-17 ENCOUNTER — LAB ENCOUNTER (OUTPATIENT)
Dept: LAB | Age: 68
End: 2018-10-17
Attending: INTERNAL MEDICINE
Payer: MEDICARE

## 2018-10-17 ENCOUNTER — PRIOR ORIGINAL RECORDS (OUTPATIENT)
Dept: OTHER | Age: 68
End: 2018-10-17

## 2018-10-17 DIAGNOSIS — I48.92 ATRIAL FLUTTER, UNSPECIFIED TYPE (HCC): ICD-10-CM

## 2018-10-17 PROCEDURE — 71046 X-RAY EXAM CHEST 2 VIEWS: CPT | Performed by: INTERNAL MEDICINE

## 2018-10-17 PROCEDURE — 80048 BASIC METABOLIC PNL TOTAL CA: CPT

## 2018-10-17 PROCEDURE — 85025 COMPLETE CBC W/AUTO DIFF WBC: CPT

## 2018-10-17 PROCEDURE — 36415 COLL VENOUS BLD VENIPUNCTURE: CPT

## 2018-10-17 NOTE — TELEPHONE ENCOUNTER
Message   Received: Today   Message Contents   Priscilla Pitts 5 Clinical Staff; [facility] Referral Pool             Hello,     This provider is out of network. Member should be utilizing in network providers.  If there are no in network pro

## 2018-10-18 LAB
BUN: 19 MG/DL
CALCIUM: 9.6 MG/DL
CHLORIDE: 103 MEQ/L
CREATININE, SERUM: 1.01 MG/DL
GLUCOSE: 77 MG/DL
HEMATOCRIT: 50.3 %
HEMOGLOBIN: 16.7 G/DL
PLATELETS: 193 K/UL
POTASSIUM, SERUM: 4.6 MEQ/L
RED BLOOD COUNT: 5.12 X 10-6/U
SODIUM: 139 MEQ/L
WHITE BLOOD COUNT: 6.5 X 10-3/U

## 2018-10-20 ENCOUNTER — HOSPITAL ENCOUNTER (OUTPATIENT)
Age: 68
Discharge: HOME | End: 2018-10-20
Attending: EMERGENCY MEDICINE
Payer: MEDICARE

## 2018-10-20 VITALS
DIASTOLIC BLOOD PRESSURE: 87 MMHG | HEART RATE: 68 BPM | BODY MASS INDEX: 24 KG/M2 | SYSTOLIC BLOOD PRESSURE: 125 MMHG | TEMPERATURE: 97 F | WEIGHT: 165 LBS | OXYGEN SATURATION: 95 % | RESPIRATION RATE: 16 BRPM

## 2018-10-20 DIAGNOSIS — L23.7 POISON IVY DERMATITIS: Primary | ICD-10-CM

## 2018-10-20 PROCEDURE — 99213 OFFICE O/P EST LOW 20 MIN: CPT

## 2018-10-20 PROCEDURE — 99214 OFFICE O/P EST MOD 30 MIN: CPT

## 2018-10-20 RX ORDER — PREDNISONE 20 MG/1
40 TABLET ORAL DAILY
Qty: 8 TABLET | Refills: 0 | Status: SHIPPED | OUTPATIENT
Start: 2018-10-20 | End: 2018-10-24

## 2018-10-20 NOTE — ED PROVIDER NOTES
Patient presents with:  Rash Skin Problem (integumentary)    Stated Complaint: rash    HPI  Patient complains of skin rash for  3 days. Located l forearm. Describes as itchy, draining clear fluid. Possible exposures include: Working in yard.    No fever kg/m²       GENERAL: awake alert no distress  HEENT: EOMI, MMM no lesions  EXTREMITIES: l forearm with linear vesicular lesion on erythematous base with clear drainage  NEURO: alert, oriented x 3, 2-12 intact, no focal deficits appreciated  SKIN:  See Orie Earnest

## 2018-10-22 ENCOUNTER — HOSPITAL ENCOUNTER (OUTPATIENT)
Dept: INTERVENTIONAL RADIOLOGY/VASCULAR | Facility: HOSPITAL | Age: 68
Discharge: HOME | End: 2018-10-22
Attending: INTERNAL MEDICINE | Admitting: INTERNAL MEDICINE
Payer: MEDICARE

## 2018-10-22 VITALS
WEIGHT: 165 LBS | HEART RATE: 61 BPM | SYSTOLIC BLOOD PRESSURE: 114 MMHG | DIASTOLIC BLOOD PRESSURE: 66 MMHG | RESPIRATION RATE: 18 BRPM | HEIGHT: 70 IN | BODY MASS INDEX: 23.62 KG/M2 | OXYGEN SATURATION: 93 %

## 2018-10-22 DIAGNOSIS — I48.91 A-FIB (HCC): ICD-10-CM

## 2018-10-22 PROCEDURE — 96365 THER/PROPH/DIAG IV INF INIT: CPT

## 2018-10-22 PROCEDURE — 99152 MOD SED SAME PHYS/QHP 5/>YRS: CPT

## 2018-10-22 PROCEDURE — 99153 MOD SED SAME PHYS/QHP EA: CPT

## 2018-10-22 PROCEDURE — 93279 PRGRMG DEV EVAL PM/LDLS PM: CPT

## 2018-10-22 PROCEDURE — 36415 COLL VENOUS BLD VENIPUNCTURE: CPT

## 2018-10-22 PROCEDURE — 93650 ICAR CATH ABLTJ AV NODE FUNC: CPT

## 2018-10-22 PROCEDURE — 02583ZZ DESTRUCTION OF CONDUCTION MECHANISM, PERCUTANEOUS APPROACH: ICD-10-PCS | Performed by: INTERNAL MEDICINE

## 2018-10-22 RX ORDER — SODIUM CHLORIDE 9 MG/ML
INJECTION, SOLUTION INTRAVENOUS CONTINUOUS
Status: DISCONTINUED | OUTPATIENT
Start: 2018-10-22 | End: 2018-10-22

## 2018-10-22 RX ORDER — SODIUM CHLORIDE 9 MG/ML
INJECTION, SOLUTION INTRAVENOUS
Status: COMPLETED
Start: 2018-10-22 | End: 2018-10-22

## 2018-10-22 RX ORDER — MIDAZOLAM HYDROCHLORIDE 1 MG/ML
INJECTION INTRAMUSCULAR; INTRAVENOUS
Status: COMPLETED
Start: 2018-10-22 | End: 2018-10-22

## 2018-10-22 RX ORDER — LIDOCAINE HYDROCHLORIDE 20 MG/ML
INJECTION, SOLUTION EPIDURAL; INFILTRATION; INTRACAUDAL; PERINEURAL
Status: COMPLETED
Start: 2018-10-22 | End: 2018-10-22

## 2018-10-22 RX ADMIN — SODIUM CHLORIDE: 9 INJECTION, SOLUTION INTRAVENOUS at 08:45:00

## 2018-10-22 NOTE — PROGRESS NOTES
Pt able to eat and drink, no nausea or vomiting. After bedrest pt ambulated, site remained clean, soft and no hematoma.   Pt to follow up with afib clinic in 1 week and Dr. Tamie Loera in 3 -4 weeks

## 2018-10-22 NOTE — PROCEDURES
Procedures performed:  1. RFA of AV node  2. Pacer interrogation with reprogramming. : Jesus Lowery MD    Indication: Chronic AF with difficult to control rates, intolerance to medicines. Cardiomyopathy, single chamber icd.     Complication: none

## 2018-10-22 NOTE — H&P
[facility]  H&P    Polo Marsh Patient Status:  Outpatient in a Bed    10/24/1950 MRN [id number]   Location [facility] Attending Hubert Bai, [address] [facility] [address] Day # 0 PCP Jenny Guy 115/70, pulse 60, resp. rate 20, height 5' 10\" (1.778 m), weight 165 lb (74.8 kg), SpO2 95 %. No data recorded.     Wt Readings from Last 3 Encounters:  10/19/18 : 165 lb (74.8 kg)  10/20/18 : 165 lb (74.8 kg)  09/26/18 : 167 lb (75.8 kg)        General:

## 2018-10-25 NOTE — PROGRESS NOTES
Yeny 34 Patient Status:  No patient class for patient encounter    10/24/1950 MRN [id number]   Location MD Farrukh Mcneil MD Dusty Pacas is 04/14/2018 11:32 AM    ALKPHO 59 04/14/2018 11:32 AM    BILT 1.7 (H) 04/14/2018 11:32 AM    TP 7.2 04/14/2018 11:32 AM    AST 23 04/14/2018 11:32 AM    ALT 19 04/14/2018 11:32 AM    INR 2.2 (H) 07/11/2017 03:16 PM    PTP 24.2 (H) 07/11/2017 03:16 PM    TSH xarelto 20 mg daily  - arranged for remote device interrogation today    Cardiomyopathy with single chamber ICD and Chronic systolic HF- EF 49%  -NHYA class 2  -euvolemic  -continue coreg and lisinopril at current doses, consider increasing dosing after de

## 2018-10-26 ENCOUNTER — OFFICE VISIT (OUTPATIENT)
Dept: CARDIOLOGY CLINIC | Facility: HOSPITAL | Age: 68
End: 2018-10-26
Attending: INTERNAL MEDICINE
Payer: MEDICARE

## 2018-10-26 VITALS
OXYGEN SATURATION: 99 % | HEART RATE: 89 BPM | SYSTOLIC BLOOD PRESSURE: 138 MMHG | WEIGHT: 167.81 LBS | DIASTOLIC BLOOD PRESSURE: 79 MMHG | BODY MASS INDEX: 24 KG/M2

## 2018-10-26 DIAGNOSIS — Z95.810 PRESENCE OF SINGLE CHAMBER IMPLANTABLE CARDIOVERTER-DEFIBRILLATOR (ICD): ICD-10-CM

## 2018-10-26 DIAGNOSIS — I48.91 ATRIAL FIBRILLATION (HCC): ICD-10-CM

## 2018-10-26 DIAGNOSIS — Z98.890 S/P AV (ATRIOVENTRICULAR) NODAL ABLATION: ICD-10-CM

## 2018-10-26 DIAGNOSIS — I42.9 CARDIOMYOPATHY, UNSPECIFIED TYPE (HCC): ICD-10-CM

## 2018-10-26 DIAGNOSIS — I48.19 ATRIAL FIBRILLATION, PERSISTENT (HCC): Primary | ICD-10-CM

## 2018-10-26 PROBLEM — R42 INTERMITTENT LIGHTHEADEDNESS: Chronic | Status: ACTIVE | Noted: 2018-10-26

## 2018-10-26 PROBLEM — R07.89 CHEST PRESSURE: Chronic | Status: ACTIVE | Noted: 2018-10-26

## 2018-10-26 PROBLEM — I50.22 CHRONIC SYSTOLIC HEART FAILURE (HCC): Chronic | Status: ACTIVE | Noted: 2018-10-26

## 2018-10-26 PROBLEM — Z86.79 S/P ABLATION OF ATRIAL FIBRILLATION: Chronic | Status: ACTIVE | Noted: 2018-10-22

## 2018-10-26 PROBLEM — I50.42 CHRONIC COMBINED SYSTOLIC AND DIASTOLIC HF (HEART FAILURE), NYHA CLASS 2 (HCC): Chronic | Status: ACTIVE | Noted: 2018-10-26

## 2018-10-26 PROBLEM — I50.42 CHRONIC COMBINED SYSTOLIC AND DIASTOLIC HF (HEART FAILURE), NYHA CLASS 2 (HCC): Chronic | Status: RESOLVED | Noted: 2018-10-26 | Resolved: 2018-10-26

## 2018-10-26 PROBLEM — Z86.79 S/P ABLATION OF ATRIAL FIBRILLATION: Chronic | Status: ACTIVE | Noted: 2018-10-26

## 2018-10-26 PROCEDURE — 93005 ELECTROCARDIOGRAM TRACING: CPT

## 2018-10-26 PROCEDURE — 93010 ELECTROCARDIOGRAM REPORT: CPT | Performed by: NURSE PRACTITIONER

## 2018-10-26 PROCEDURE — 99214 OFFICE O/P EST MOD 30 MIN: CPT | Performed by: NURSE PRACTITIONER

## 2018-10-26 PROCEDURE — 99212 OFFICE O/P EST SF 10 MIN: CPT | Performed by: NURSE PRACTITIONER

## 2018-10-26 RX ORDER — LISINOPRIL 2.5 MG/1
2.5 TABLET ORAL NIGHTLY
Qty: 30 TABLET | Refills: 5 | COMMUNITY
Start: 2018-10-26 | End: 2018-11-15

## 2018-10-26 NOTE — PATIENT INSTRUCTIONS
Send a pacemaker report from home today    Continue all your same medications and switch lisinopril to bedtime    Call if having any dizziness, lightheadedness, heart racing, palpitations, chest pain, shortness of breath, coughing, swelling, weight gain, f

## 2018-11-13 ENCOUNTER — TELEPHONE (OUTPATIENT)
Dept: CARDIOLOGY CLINIC | Facility: HOSPITAL | Age: 68
End: 2018-11-13

## 2018-11-13 NOTE — TELEPHONE ENCOUNTER
Message left for patient regarding home device check results. No atrial fib or arrhythmias seen on device check since ablation. Pt V pacing % . Asked patient to call clinic with questions.

## 2018-11-15 ENCOUNTER — OFFICE VISIT (OUTPATIENT)
Dept: CARDIOLOGY CLINIC | Facility: HOSPITAL | Age: 68
End: 2018-11-15
Attending: INTERNAL MEDICINE
Payer: MEDICARE

## 2018-11-15 VITALS
BODY MASS INDEX: 24 KG/M2 | OXYGEN SATURATION: 96 % | DIASTOLIC BLOOD PRESSURE: 80 MMHG | SYSTOLIC BLOOD PRESSURE: 138 MMHG | HEART RATE: 60 BPM | WEIGHT: 166 LBS

## 2018-11-15 DIAGNOSIS — Z98.890 S/P AV (ATRIOVENTRICULAR) NODAL ABLATION: ICD-10-CM

## 2018-11-15 DIAGNOSIS — Z86.79 S/P ABLATION OF ATRIAL FIBRILLATION: Primary | ICD-10-CM

## 2018-11-15 DIAGNOSIS — Z98.890 S/P ABLATION OF ATRIAL FIBRILLATION: Primary | ICD-10-CM

## 2018-11-15 DIAGNOSIS — I42.9 CARDIOMYOPATHY, UNSPECIFIED TYPE (HCC): ICD-10-CM

## 2018-11-15 DIAGNOSIS — I50.22 CHRONIC SYSTOLIC HEART FAILURE (HCC): ICD-10-CM

## 2018-11-15 DIAGNOSIS — Z95.810 PRESENCE OF SINGLE CHAMBER IMPLANTABLE CARDIOVERTER-DEFIBRILLATOR (ICD): ICD-10-CM

## 2018-11-15 DIAGNOSIS — I48.91 ATRIAL FIBRILLATION (HCC): ICD-10-CM

## 2018-11-15 PROCEDURE — 99214 OFFICE O/P EST MOD 30 MIN: CPT | Performed by: NURSE PRACTITIONER

## 2018-11-15 PROCEDURE — 93010 ELECTROCARDIOGRAM REPORT: CPT | Performed by: NURSE PRACTITIONER

## 2018-11-15 PROCEDURE — 99212 OFFICE O/P EST SF 10 MIN: CPT | Performed by: NURSE PRACTITIONER

## 2018-11-15 PROCEDURE — 93005 ELECTROCARDIOGRAM TRACING: CPT

## 2018-11-15 RX ORDER — LISINOPRIL 5 MG/1
5 TABLET ORAL NIGHTLY
Qty: 30 TABLET | Refills: 1 | Status: SHIPPED | OUTPATIENT
Start: 2018-11-15 | End: 2019-03-19

## 2018-11-15 NOTE — PROGRESS NOTES
Yeny 34 Patient Status:  No patient class for patient encounter    10/24/1950 MRN [id number]   Location MD Ivonne Garvin MD Kallie Loosen is [place] 9.6 10/17/2018 02:34 PM    ALB 4.2 04/14/2018 11:32 AM    ALKPHO 59 04/14/2018 11:32 AM    BILT 1.7 (H) 04/14/2018 11:32 AM    TP 7.2 04/14/2018 11:32 AM    AST 23 04/14/2018 11:32 AM    ALT 19 04/14/2018 11:32 AM    INR 2.2 (H) 07/11/2017 03:16 PM    P breath or palpitations  - blood pressure is normal  -Continue coreg 12.5 mg bid and xarelto 20 mg daily    Cardiomyopathy with single chamber ICD and Chronic systolic HF- EF 51%  -NHYA class 2  -euvolemic  -continue coreg at 12.5 mg bid  -increase lisinopr

## 2018-11-20 ENCOUNTER — MYAURORA ACCOUNT LINK (OUTPATIENT)
Dept: OTHER | Age: 68
End: 2018-11-20

## 2019-01-15 ENCOUNTER — TELEPHONE (OUTPATIENT)
Dept: INTERNAL MEDICINE CLINIC | Facility: CLINIC | Age: 69
End: 2019-01-15

## 2019-01-23 ENCOUNTER — OFFICE VISIT (OUTPATIENT)
Dept: INTERNAL MEDICINE CLINIC | Facility: CLINIC | Age: 69
End: 2019-01-23
Payer: MEDICARE

## 2019-01-23 VITALS
HEART RATE: 61 BPM | BODY MASS INDEX: 24.91 KG/M2 | HEIGHT: 70 IN | WEIGHT: 174 LBS | OXYGEN SATURATION: 98 % | DIASTOLIC BLOOD PRESSURE: 86 MMHG | SYSTOLIC BLOOD PRESSURE: 128 MMHG

## 2019-01-23 DIAGNOSIS — K64.9 RECTAL VARICES: ICD-10-CM

## 2019-01-23 DIAGNOSIS — I42.0 DILATED CARDIOMYOPATHY (HCC): Primary | ICD-10-CM

## 2019-01-23 DIAGNOSIS — I48.0 PAROXYSMAL ATRIAL FIBRILLATION (HCC): ICD-10-CM

## 2019-01-23 DIAGNOSIS — Z12.5 PROSTATE CANCER SCREENING: ICD-10-CM

## 2019-01-23 PROCEDURE — 99214 OFFICE O/P EST MOD 30 MIN: CPT | Performed by: INTERNAL MEDICINE

## 2019-01-23 NOTE — PROGRESS NOTES
HPI:    Patient ID: Miguel Ackerman is a 77year old male. Pt here for a fu on atrial fib and SP ablation and reprogramming of PPM.  HAs been feeling well and even partaking in winter sports. Remains on Xarelto for stroke prevention.   Recently has this encounter       Imaging & Referrals:  None             Jenna Hines MD  1/23/2019

## 2019-02-18 ENCOUNTER — PRIOR ORIGINAL RECORDS (OUTPATIENT)
Dept: OTHER | Age: 69
End: 2019-02-18

## 2019-02-23 ENCOUNTER — APPOINTMENT (OUTPATIENT)
Dept: LAB | Age: 69
End: 2019-02-23
Attending: INTERNAL MEDICINE
Payer: MEDICARE

## 2019-02-23 DIAGNOSIS — I42.0 DILATED CARDIOMYOPATHY (HCC): ICD-10-CM

## 2019-02-23 DIAGNOSIS — Z12.5 PROSTATE CANCER SCREENING: ICD-10-CM

## 2019-02-23 LAB
ALBUMIN SERPL-MCNC: 3.8 G/DL (ref 3.4–5)
ALBUMIN/GLOB SERPL: 1.1 {RATIO} (ref 1–2)
ALP LIVER SERPL-CCNC: 76 U/L (ref 45–117)
ALT SERPL-CCNC: 34 U/L (ref 16–61)
ANION GAP SERPL CALC-SCNC: 2 MMOL/L (ref 0–18)
AST SERPL-CCNC: 27 U/L (ref 15–37)
BILIRUB SERPL-MCNC: 1.8 MG/DL (ref 0.1–2)
BUN BLD-MCNC: 24 MG/DL (ref 7–18)
BUN/CREAT SERPL: 20.5 (ref 10–20)
CALCIUM BLD-MCNC: 9.5 MG/DL (ref 8.5–10.1)
CHLORIDE SERPL-SCNC: 109 MMOL/L (ref 98–107)
CHOLEST SMN-MCNC: 171 MG/DL (ref ?–200)
CO2 SERPL-SCNC: 31 MMOL/L (ref 21–32)
COMPLEXED PSA SERPL-MCNC: 2.57 NG/ML (ref ?–4)
CREAT BLD-MCNC: 1.17 MG/DL (ref 0.7–1.3)
GLOBULIN PLAS-MCNC: 3.6 G/DL (ref 2.8–4.4)
GLUCOSE BLD-MCNC: 90 MG/DL (ref 70–99)
HDLC SERPL-MCNC: 35 MG/DL (ref 40–59)
LDLC SERPL CALC-MCNC: 120 MG/DL (ref ?–100)
M PROTEIN MFR SERPL ELPH: 7.4 G/DL (ref 6.4–8.2)
NONHDLC SERPL-MCNC: 136 MG/DL (ref ?–130)
OSMOLALITY SERPL CALC.SUM OF ELEC: 298 MOSM/KG (ref 275–295)
POTASSIUM SERPL-SCNC: 5 MMOL/L (ref 3.5–5.1)
SODIUM SERPL-SCNC: 142 MMOL/L (ref 136–145)
TRIGL SERPL-MCNC: 79 MG/DL (ref 30–149)
VLDLC SERPL CALC-MCNC: 16 MG/DL (ref 0–30)

## 2019-02-23 PROCEDURE — 36415 COLL VENOUS BLD VENIPUNCTURE: CPT

## 2019-02-23 PROCEDURE — 80053 COMPREHEN METABOLIC PANEL: CPT

## 2019-02-23 PROCEDURE — 80061 LIPID PANEL: CPT

## 2019-02-28 VITALS
HEART RATE: 60 BPM | DIASTOLIC BLOOD PRESSURE: 80 MMHG | RESPIRATION RATE: 16 BRPM | WEIGHT: 171 LBS | SYSTOLIC BLOOD PRESSURE: 114 MMHG

## 2019-02-28 VITALS
DIASTOLIC BLOOD PRESSURE: 70 MMHG | HEIGHT: 70 IN | HEART RATE: 78 BPM | BODY MASS INDEX: 23.62 KG/M2 | WEIGHT: 165 LBS | SYSTOLIC BLOOD PRESSURE: 100 MMHG | OXYGEN SATURATION: 95 %

## 2019-02-28 VITALS
WEIGHT: 165 LBS | BODY MASS INDEX: 23.62 KG/M2 | RESPIRATION RATE: 16 BRPM | SYSTOLIC BLOOD PRESSURE: 130 MMHG | HEART RATE: 90 BPM | DIASTOLIC BLOOD PRESSURE: 84 MMHG | HEIGHT: 70 IN

## 2019-02-28 VITALS
BODY MASS INDEX: 23.62 KG/M2 | HEART RATE: 93 BPM | OXYGEN SATURATION: 99 % | DIASTOLIC BLOOD PRESSURE: 90 MMHG | WEIGHT: 165 LBS | SYSTOLIC BLOOD PRESSURE: 132 MMHG | HEIGHT: 70 IN

## 2019-02-28 VITALS
BODY MASS INDEX: 23.48 KG/M2 | HEART RATE: 56 BPM | HEIGHT: 70 IN | WEIGHT: 164 LBS | OXYGEN SATURATION: 97 % | DIASTOLIC BLOOD PRESSURE: 81 MMHG | RESPIRATION RATE: 16 BRPM | SYSTOLIC BLOOD PRESSURE: 124 MMHG

## 2019-03-01 VITALS
DIASTOLIC BLOOD PRESSURE: 80 MMHG | BODY MASS INDEX: 22.62 KG/M2 | SYSTOLIC BLOOD PRESSURE: 120 MMHG | RESPIRATION RATE: 8 BRPM | WEIGHT: 158 LBS | OXYGEN SATURATION: 97 % | HEART RATE: 61 BPM | HEIGHT: 70 IN

## 2019-03-19 RX ORDER — LISINOPRIL 5 MG/1
5 TABLET ORAL NIGHTLY
Qty: 30 TABLET | Refills: 2 | Status: SHIPPED | OUTPATIENT
Start: 2019-03-19 | End: 2019-03-27

## 2019-03-20 ENCOUNTER — TELEPHONE (OUTPATIENT)
Dept: INTERNAL MEDICINE CLINIC | Facility: CLINIC | Age: 69
End: 2019-03-20

## 2019-03-27 ENCOUNTER — OFFICE VISIT (OUTPATIENT)
Dept: INTERNAL MEDICINE CLINIC | Facility: CLINIC | Age: 69
End: 2019-03-27
Payer: MEDICARE

## 2019-03-27 VITALS
SYSTOLIC BLOOD PRESSURE: 126 MMHG | WEIGHT: 173 LBS | BODY MASS INDEX: 24.77 KG/M2 | HEART RATE: 63 BPM | OXYGEN SATURATION: 98 % | HEIGHT: 70 IN | DIASTOLIC BLOOD PRESSURE: 84 MMHG

## 2019-03-27 DIAGNOSIS — J30.1 SEASONAL ALLERGIC RHINITIS DUE TO POLLEN: ICD-10-CM

## 2019-03-27 DIAGNOSIS — I48.0 PAROXYSMAL ATRIAL FIBRILLATION (HCC): ICD-10-CM

## 2019-03-27 DIAGNOSIS — I42.0 DILATED CARDIOMYOPATHY (HCC): ICD-10-CM

## 2019-03-27 DIAGNOSIS — Z00.00 MEDICARE ANNUAL WELLNESS VISIT, SUBSEQUENT: Primary | ICD-10-CM

## 2019-03-27 PROCEDURE — 99397 PER PM REEVAL EST PAT 65+ YR: CPT | Performed by: INTERNAL MEDICINE

## 2019-03-27 PROCEDURE — G0439 PPPS, SUBSEQ VISIT: HCPCS | Performed by: INTERNAL MEDICINE

## 2019-03-27 PROCEDURE — 96160 PT-FOCUSED HLTH RISK ASSMT: CPT | Performed by: INTERNAL MEDICINE

## 2019-03-27 RX ORDER — LISINOPRIL 5 MG/1
5 TABLET ORAL NIGHTLY
Qty: 30 TABLET | Refills: 11 | Status: SHIPPED | OUTPATIENT
Start: 2019-03-27 | End: 2021-01-05

## 2019-03-27 NOTE — PROGRESS NOTES
HPI:   Casimiro Hanna is a 77year old male who presents for a MA Supervisit.     Patient Active Problem List:     Hyperlipidemia diet controlled     Atrial flutter ([address] resolved     Cardiomyopathy ([address] compensated with lisinopril     Long term (c Scorin          Depression Screening (PHQ-2/PHQ-9): Over the LAST 2 WEEKS   Little interest or pleasure in doing things (over the last two weeks)?: Several days    Feeling down, depressed, or hopeless (over the last two weeks)?: Not at all    PHQ-2 SCO of beer per week      Comment: 3 drinks per week    Drug use: No    Occ: programer : yes      REVIEW OF SYSTEMS:   GENERAL: feels well otherwise  SKIN: denies any unusual skin lesions  EYES: denies blurred vision or double vision  HEENT: denies nasa Assessment.      PLAN SUMMARY:   Diagnoses and all orders for this visit:    Medicare annual wellness visit, subsequent up to date on labs and colonoscopy    Dilated cardiomyopathy ([facility] compensated with ace    Paroxysmal atrial fibrillation ([facility] [address] [facility] on Leslie Corbin this or any previous visit.  Update Immunization Activity if applicable        SPECIFIC DISEASE MONITORING Internal Lab or Procedure External Lab or Procedure   Annual Monitoring of Persistent     Medications (ACE/ARB, digoxin diuretics, anticonvulsants.)

## 2019-06-04 ENCOUNTER — APPOINTMENT (OUTPATIENT)
Dept: CARDIOLOGY | Age: 69
End: 2019-06-04
Attending: INTERNAL MEDICINE

## 2019-06-04 PROCEDURE — 93295 DEV INTERROG REMOTE 1/2/MLT: CPT | Performed by: INTERNAL MEDICINE

## 2019-06-04 PROCEDURE — 93296 REM INTERROG EVL PM/IDS: CPT | Performed by: INTERNAL MEDICINE

## 2019-06-17 RX ORDER — RIVAROXABAN 20 MG/1
TABLET, FILM COATED ORAL
Qty: 30 TABLET | Refills: 6 | OUTPATIENT
Start: 2019-06-17

## 2019-06-17 RX ORDER — LISINOPRIL 5 MG/1
5 TABLET ORAL DAILY
COMMUNITY
Start: 2019-02-18 | End: 2019-06-18 | Stop reason: SDUPTHER

## 2019-06-17 RX ORDER — CARVEDILOL 6.25 MG/1
6.25 TABLET ORAL 2 TIMES DAILY
COMMUNITY
Start: 2019-02-07 | End: 2019-08-06

## 2019-06-18 RX ORDER — LISINOPRIL 5 MG/1
5 TABLET ORAL DAILY
Qty: 90 TABLET | Refills: 1 | Status: SHIPPED | OUTPATIENT
Start: 2019-06-18 | End: 2019-12-10 | Stop reason: SDUPTHER

## 2019-07-01 ENCOUNTER — ANCILLARY ORDERS (OUTPATIENT)
Dept: CARDIOLOGY | Age: 69
End: 2019-07-01

## 2019-07-01 ENCOUNTER — ANCILLARY PROCEDURE (OUTPATIENT)
Dept: CARDIOLOGY | Age: 69
End: 2019-07-01
Attending: INTERNAL MEDICINE

## 2019-07-01 DIAGNOSIS — Z45.02 IMPLANTABLE DEFIBRILLATOR REPROGRAMMING/CHECK: ICD-10-CM

## 2019-07-09 ENCOUNTER — OFFICE VISIT (OUTPATIENT)
Dept: AUDIOLOGY | Facility: CLINIC | Age: 69
End: 2019-07-09
Payer: MEDICARE

## 2019-07-09 ENCOUNTER — OFFICE VISIT (OUTPATIENT)
Dept: OTOLARYNGOLOGY | Facility: CLINIC | Age: 69
End: 2019-07-09
Payer: MEDICARE

## 2019-07-09 VITALS
TEMPERATURE: 98 F | DIASTOLIC BLOOD PRESSURE: 81 MMHG | SYSTOLIC BLOOD PRESSURE: 129 MMHG | HEIGHT: 70 IN | BODY MASS INDEX: 23.62 KG/M2 | WEIGHT: 165 LBS

## 2019-07-09 DIAGNOSIS — H69.83 DYSFUNCTION OF BOTH EUSTACHIAN TUBES: Primary | ICD-10-CM

## 2019-07-09 DIAGNOSIS — H90.3 SENSORINEURAL HEARING LOSS, BILATERAL: ICD-10-CM

## 2019-07-09 DIAGNOSIS — H93.19 TINNITUS, UNSPECIFIED LATERALITY: ICD-10-CM

## 2019-07-09 PROCEDURE — 92567 TYMPANOMETRY: CPT | Performed by: AUDIOLOGIST

## 2019-07-09 PROCEDURE — 99203 OFFICE O/P NEW LOW 30 MIN: CPT | Performed by: OTOLARYNGOLOGY

## 2019-07-09 PROCEDURE — 92557 COMPREHENSIVE HEARING TEST: CPT | Performed by: AUDIOLOGIST

## 2019-07-09 RX ORDER — FLUTICASONE PROPIONATE 50 MCG
2 SPRAY, SUSPENSION (ML) NASAL DAILY
Qty: 1 BOTTLE | Refills: 3 | Status: SHIPPED | OUTPATIENT
Start: 2019-07-09 | End: 2019-10-01

## 2019-07-10 PROBLEM — H90.3 SENSORINEURAL HEARING LOSS, BILATERAL: Status: ACTIVE | Noted: 2019-07-10

## 2019-07-10 NOTE — PROGRESS NOTES
AUDIOGRAM     Hussein Glez was referred for testing by Love Jose V for bilateral tinnitus. 10/24/1950  [id number]        Otoscopic inspection: right ear no cerumen; left ear no cerumen.        Tests/Procedures  Patient was tested via

## 2019-07-10 NOTE — PROGRESS NOTES
Jayant Lozano is a 77year old male.  Patient presents with:  Ear Problem: patient stated he felt that some fluids to his ears ,heard ringing sounds  on and off for 2 years    HPI:   Is been experiencing a feeling of fullness and a watery sensation i Oral/Oropharynx Normal Lips - Normal, Tonsils - Normal, Tongue - Normal    Nasal Normal External nose - Normal. Nasal septum -nasal septal deviation to the right with congested nasal mucosa   Neurological Normal Memory - Normal. Cranial nerves - Cranial

## 2019-07-10 NOTE — PATIENT INSTRUCTIONS
How Hearing Aids Can Help You    If you’re losing your hearing, it can be frustrating: But, hearing aids can help you hear what Magdalene Yuana been missing. Not everyone who has hearing loss needs hearing aids.  But if your hearing loss is keeping you from commun © 4606-0170 [address] [facility], [facility] [place]. All rights reserved. This information is not intended as a substitute for professional medical care. Always follow your healthcare professional's instructions.

## 2019-08-05 PROBLEM — E78.00 PURE HYPERCHOLESTEROLEMIA: Status: ACTIVE | Noted: 2019-08-05

## 2019-08-05 PROBLEM — I50.22 HEART FAILURE, CHRONIC SYSTOLIC (CMD): Status: ACTIVE | Noted: 2019-08-05

## 2019-08-05 PROBLEM — I42.9 CARDIOMYOPATHY (CMD): Status: ACTIVE | Noted: 2019-08-05

## 2019-08-05 PROBLEM — Z79.01 ANTICOAGULATED ON COUMADIN: Status: ACTIVE | Noted: 2019-08-05

## 2019-08-05 PROBLEM — I48.20 CHRONIC ATRIAL FIBRILLATION (CMD): Status: ACTIVE | Noted: 2019-08-05

## 2019-08-05 PROBLEM — G45.9 TIA (TRANSIENT ISCHEMIC ATTACK): Status: ACTIVE | Noted: 2019-08-05

## 2019-08-05 RX ORDER — FLUTICASONE PROPIONATE 50 MCG
2 SPRAY, SUSPENSION (ML) NASAL DAILY
COMMUNITY
Start: 2019-07-09 | End: 2021-01-19 | Stop reason: ALTCHOICE

## 2019-08-06 ENCOUNTER — OFFICE VISIT (OUTPATIENT)
Dept: CARDIOLOGY | Age: 69
End: 2019-08-06

## 2019-08-06 VITALS
WEIGHT: 164 LBS | HEIGHT: 70 IN | SYSTOLIC BLOOD PRESSURE: 136 MMHG | DIASTOLIC BLOOD PRESSURE: 80 MMHG | OXYGEN SATURATION: 96 % | BODY MASS INDEX: 23.48 KG/M2 | HEART RATE: 64 BPM

## 2019-08-06 DIAGNOSIS — I48.20 CHRONIC ATRIAL FIBRILLATION (CMD): Primary | ICD-10-CM

## 2019-08-06 DIAGNOSIS — Z45.02 IMPLANTABLE DEFIBRILLATOR REPROGRAMMING/CHECK: ICD-10-CM

## 2019-08-06 DIAGNOSIS — I44.2 CHB (COMPLETE HEART BLOCK) (CMD): ICD-10-CM

## 2019-08-06 DIAGNOSIS — I50.22 HEART FAILURE, CHRONIC SYSTOLIC (CMD): ICD-10-CM

## 2019-08-06 PROCEDURE — 99214 OFFICE O/P EST MOD 30 MIN: CPT | Performed by: INTERNAL MEDICINE

## 2019-08-06 PROCEDURE — 93000 ELECTROCARDIOGRAM COMPLETE: CPT | Performed by: INTERNAL MEDICINE

## 2019-08-06 SDOH — HEALTH STABILITY: MENTAL HEALTH: HOW OFTEN DO YOU HAVE A DRINK CONTAINING ALCOHOL?: NEVER

## 2019-08-06 ASSESSMENT — PATIENT HEALTH QUESTIONNAIRE - PHQ9
1. LITTLE INTEREST OR PLEASURE IN DOING THINGS: NOT AT ALL
2. FEELING DOWN, DEPRESSED OR HOPELESS: NOT AT ALL
SUM OF ALL RESPONSES TO PHQ9 QUESTIONS 1 AND 2: 0
SUM OF ALL RESPONSES TO PHQ9 QUESTIONS 1 AND 2: 0

## 2019-09-10 PROCEDURE — 93295 DEV INTERROG REMOTE 1/2/MLT: CPT | Performed by: INTERNAL MEDICINE

## 2019-09-10 PROCEDURE — 93296 REM INTERROG EVL PM/IDS: CPT | Performed by: INTERNAL MEDICINE

## 2019-10-01 RX ORDER — FLUTICASONE PROPIONATE 50 MCG
SPRAY, SUSPENSION (ML) NASAL
Qty: 1 BOTTLE | Refills: 1 | Status: SHIPPED | OUTPATIENT
Start: 2019-10-01 | End: 2021-04-23

## 2019-10-03 ENCOUNTER — ANCILLARY ORDERS (OUTPATIENT)
Dept: CARDIOLOGY | Age: 69
End: 2019-10-03

## 2019-10-03 ENCOUNTER — ANCILLARY PROCEDURE (OUTPATIENT)
Dept: CARDIOLOGY | Age: 69
End: 2019-10-03
Attending: INTERNAL MEDICINE

## 2019-10-03 DIAGNOSIS — Z95.810 ICD (IMPLANTABLE CARDIOVERTER-DEFIBRILLATOR) IN PLACE: ICD-10-CM

## 2019-10-03 PROCEDURE — X1114 CARDIAC DEVICE HOME CHECK - REMOTE UNSCHEDULED: HCPCS | Performed by: INTERNAL MEDICINE

## 2019-12-10 RX ORDER — RIVAROXABAN 20 MG/1
TABLET, FILM COATED ORAL
Qty: 90 TABLET | Refills: 1 | Status: SHIPPED | OUTPATIENT
Start: 2019-12-10 | End: 2020-06-05 | Stop reason: SDUPTHER

## 2019-12-10 RX ORDER — LISINOPRIL 5 MG/1
TABLET ORAL
Qty: 90 TABLET | Refills: 1 | Status: SHIPPED | OUTPATIENT
Start: 2019-12-10 | End: 2020-06-05 | Stop reason: SDUPTHER

## 2019-12-17 PROCEDURE — 93295 DEV INTERROG REMOTE 1/2/MLT: CPT | Performed by: INTERNAL MEDICINE

## 2019-12-17 PROCEDURE — 93296 REM INTERROG EVL PM/IDS: CPT | Performed by: INTERNAL MEDICINE

## 2020-01-03 ENCOUNTER — ANCILLARY PROCEDURE (OUTPATIENT)
Dept: CARDIOLOGY | Age: 70
End: 2020-01-03
Attending: INTERNAL MEDICINE

## 2020-01-03 DIAGNOSIS — Z45.02 ENCOUNTER FOR ASSESSMENT OF IMPLANTABLE CARDIOVERTER-DEFIBRILLATOR (ICD): ICD-10-CM

## 2020-01-15 ENCOUNTER — TELEPHONE (OUTPATIENT)
Dept: INTERNAL MEDICINE CLINIC | Facility: CLINIC | Age: 70
End: 2020-01-15

## 2020-02-26 ENCOUNTER — OFFICE VISIT (OUTPATIENT)
Dept: INTERNAL MEDICINE CLINIC | Facility: CLINIC | Age: 70
End: 2020-02-26
Payer: MEDICARE

## 2020-02-26 VITALS
DIASTOLIC BLOOD PRESSURE: 92 MMHG | SYSTOLIC BLOOD PRESSURE: 146 MMHG | WEIGHT: 165 LBS | HEART RATE: 66 BPM | OXYGEN SATURATION: 97 % | BODY MASS INDEX: 23.62 KG/M2 | HEIGHT: 70 IN

## 2020-02-26 DIAGNOSIS — Z12.5 PROSTATE CANCER SCREENING: ICD-10-CM

## 2020-02-26 DIAGNOSIS — Z00.00 MEDICARE ANNUAL WELLNESS VISIT, SUBSEQUENT: Primary | ICD-10-CM

## 2020-02-26 DIAGNOSIS — I42.0 DILATED CARDIOMYOPATHY (HCC): ICD-10-CM

## 2020-02-26 DIAGNOSIS — I48.92 ATRIAL FLUTTER, UNSPECIFIED TYPE (HCC): ICD-10-CM

## 2020-02-26 PROCEDURE — G0439 PPPS, SUBSEQ VISIT: HCPCS | Performed by: INTERNAL MEDICINE

## 2020-02-26 PROCEDURE — 99397 PER PM REEVAL EST PAT 65+ YR: CPT | Performed by: INTERNAL MEDICINE

## 2020-02-26 PROCEDURE — 96160 PT-FOCUSED HLTH RISK ASSMT: CPT | Performed by: INTERNAL MEDICINE

## 2020-02-26 NOTE — PROGRESS NOTES
HPI:    Patient ID: Devon Webb is a 68year old male. HPI Pt here for a MA supervisit and  Fu on cardiomyopathy and atrial flutter  And PPM .  Has no current complaints.     HPI:   Devon Webb is a 68year old male who presents for a M with daily activities? : No    Memory Problems?: No      Fall/Risk Assessment          Have you fallen in the last 12 months?: 0-No                                        Fall/Risk Scorin          Depression Screening (PHQ-2/PHQ-9): Over the LAST 2 WEE Mother    • Heart Disorder Father    • Diabetes Neg    • Cancer Neg    • Glaucoma Neg       SOCIAL HISTORY:   Social History    Tobacco Use      Smoking status: Never Smoker      Smokeless tobacco: Never Used      Tobacco comment: never smoker    Alcohol u HSM or tenderness  :  deferred  RECTAL: deferred  MUSCULOSKELETAL: back is not tender, FROM of the back  EXTREMITIES: no edema  NEURO: Oriented times three, cranial nerves are intact, motor and sensory are grossly intact    ASSESSMENT AND OTHER RELEVANT previous visit. Update Immunization Activity if applicable    Tetanus No orders found for this or any previous visit.  Update Immunization Activity if applicable        SPECIFIC DISEASE MONITORING Internal Lab or Procedure External Lab or Procedure   Annual daily with food.          Allergies:  Seasonal                   PHYSICAL EXAM:   Physical Exam           ASSESSMENT/PLAN:   Medicare annual wellness visit, subsequent  (primary encounter diagnosis) check fasting labsDilated cardiomyopathy (hcc) check echo

## 2020-02-29 ENCOUNTER — APPOINTMENT (OUTPATIENT)
Dept: LAB | Facility: REFERENCE LAB | Age: 70
End: 2020-02-29
Attending: INTERNAL MEDICINE
Payer: MEDICARE

## 2020-02-29 ENCOUNTER — LAB ENCOUNTER (OUTPATIENT)
Dept: LAB | Age: 70
End: 2020-02-29
Attending: INTERNAL MEDICINE
Payer: MEDICARE

## 2020-02-29 DIAGNOSIS — Z00.00 EXAMINATION FOR, MEDICAL, GENERAL: Primary | ICD-10-CM

## 2020-02-29 DIAGNOSIS — Z12.5 PROSTATE CANCER SCREENING: ICD-10-CM

## 2020-02-29 LAB
ALBUMIN SERPL-MCNC: 3.9 G/DL
ALBUMIN SERPL-MCNC: 3.9 G/DL (ref 3.4–5)
ALBUMIN/GLOB SERPL: 1.1 {RATIO}
ALBUMIN/GLOB SERPL: 1.1 {RATIO} (ref 1–2)
ALP LIVER SERPL-CCNC: 74 U/L (ref 45–117)
ALP SERPL-CCNC: 74 U/L
ALT SERPL-CCNC: 26 U/L (ref 16–61)
ALT SERPL-CCNC: 26 UNITS/L
ANION GAP SERPL CALC-SCNC: 1 MMOL/L
ANION GAP SERPL CALC-SCNC: 1 MMOL/L (ref 0–18)
AST SERPL-CCNC: 21 U/L (ref 15–37)
AST SERPL-CCNC: 21 UNITS/L
BASOPHILS # BLD AUTO: 0.09 X10(3) UL (ref 0–0.2)
BASOPHILS NFR BLD AUTO: 1.6 %
BILIRUB SERPL-MCNC: 1.6 MG/DL
BILIRUB SERPL-MCNC: 1.6 MG/DL (ref 0.1–2)
BUN BLD-MCNC: 22 MG/DL (ref 7–18)
BUN SERPL-MCNC: 22 MG/DL
BUN/CREAT SERPL: 21.2
BUN/CREAT SERPL: 21.2 (ref 10–20)
CALCIUM BLD-MCNC: 9.9 MG/DL (ref 8.5–10.1)
CALCIUM SERPL-MCNC: 9.9 MG/DL
CHLORIDE SERPL-SCNC: 109 MMOL/L
CHLORIDE SERPL-SCNC: 109 MMOL/L (ref 98–112)
CHOLEST SERPL-MCNC: 173 MG/DL
CHOLEST SMN-MCNC: 173 MG/DL (ref ?–200)
CHOLEST/HDLC SERPL: 38 {RATIO}
CO2 SERPL-SCNC: 31 MMOL/L
CO2 SERPL-SCNC: 31 MMOL/L (ref 21–32)
CREAT BLD-MCNC: 1.04 MG/DL (ref 0.7–1.3)
CREAT SERPL-MCNC: 1.04 MG/DL
DEPRECATED RDW RBC AUTO: 56.3 FL (ref 35.1–46.3)
EOSINOPHIL # BLD AUTO: 0.44 X10(3) UL (ref 0–0.7)
EOSINOPHIL NFR BLD AUTO: 7.8 %
ERYTHROCYTE [DISTWIDTH] IN BLOOD BY AUTOMATED COUNT: 15.3 % (ref 11–15)
GLOBULIN PLAS-MCNC: 3.7 G/DL (ref 2.8–4.4)
GLOBULIN SER-MCNC: 3.7 G/DL
GLUCOSE BLD-MCNC: 87 MG/DL (ref 70–99)
GLUCOSE SERPL-MCNC: 87 MG/DL
HCT VFR BLD AUTO: 54.9 % (ref 39–53)
HCT VFR BLD CALC: 54.9 %
HDLC SERPL-MCNC: 38 MG/DL (ref 40–59)
HGB BLD-MCNC: 18 G/DL
HGB BLD-MCNC: 18 G/DL (ref 13–17.5)
IMM GRANULOCYTES # BLD AUTO: 0.03 X10(3) UL (ref 0–1)
IMM GRANULOCYTES NFR BLD: 0.5 %
LDLC SERPL CALC-MCNC: 121 MG/DL
LDLC SERPL CALC-MCNC: 121 MG/DL (ref ?–100)
LENGTH OF FAST TIME PATIENT: YES H
LENGTH OF FAST TIME PATIENT: YES H
LYMPHOCYTES # BLD AUTO: 1.49 X10(3) UL (ref 1–4)
LYMPHOCYTES NFR BLD AUTO: 26.5 %
M PROTEIN MFR SERPL ELPH: 7.6 G/DL (ref 6.4–8.2)
MCH RBC QN AUTO: 32.7 PG (ref 26–34)
MCHC RBC AUTO-ENTMCNC: 32.8 G/DL (ref 31–37)
MCV RBC AUTO: 99.8 FL (ref 80–100)
MONOCYTES # BLD AUTO: 0.68 X10(3) UL (ref 0.1–1)
MONOCYTES NFR BLD AUTO: 12.1 %
NEUTROPHILS # BLD AUTO: 2.9 X10 (3) UL (ref 1.5–7.7)
NEUTROPHILS # BLD AUTO: 2.9 X10(3) UL (ref 1.5–7.7)
NEUTROPHILS NFR BLD AUTO: 51.5 %
NONHDLC SERPL-MCNC: 135 MG/DL
NONHDLC SERPL-MCNC: 135 MG/DL (ref ?–130)
OSMOLALITY SERPL CALC.SUM OF ELEC: 295 MOSM/KG (ref 275–295)
PATIENT FASTING Y/N/NP: YES
PATIENT FASTING Y/N/NP: YES
PLATELET # BLD AUTO: 192 10(3)UL (ref 150–450)
POTASSIUM SERPL-SCNC: 5.2 MMOL/L
POTASSIUM SERPL-SCNC: 5.2 MMOL/L (ref 3.5–5.1)
PROT SERPL-MCNC: 7.6 G/DL
RBC # BLD AUTO: 5.5 X10(6)UL (ref 3.8–5.8)
RBC # BLD: 5.5 10*6/UL
SODIUM SERPL-SCNC: 141 MMOL/L
SODIUM SERPL-SCNC: 141 MMOL/L (ref 136–145)
TRIGL SERPL-MCNC: 71 MG/DL
TRIGL SERPL-MCNC: 71 MG/DL (ref 30–149)
VLDLC SERPL CALC-MCNC: 14 MG/DL
VLDLC SERPL CALC-MCNC: 14 MG/DL (ref 0–30)
WBC # BLD AUTO: 5.6 X10(3) UL (ref 4–11)
WBC # BLD: 5.6 K/MCL

## 2020-02-29 PROCEDURE — 36415 COLL VENOUS BLD VENIPUNCTURE: CPT

## 2020-02-29 PROCEDURE — 80061 LIPID PANEL: CPT

## 2020-02-29 PROCEDURE — 80053 COMPREHEN METABOLIC PANEL: CPT

## 2020-02-29 PROCEDURE — 85025 COMPLETE CBC W/AUTO DIFF WBC: CPT

## 2020-03-02 LAB — COMPLEXED PSA SERPL-MCNC: 2.1 NG/ML (ref ?–4)

## 2020-03-04 ENCOUNTER — TELEPHONE (OUTPATIENT)
Dept: INTERNAL MEDICINE CLINIC | Facility: CLINIC | Age: 70
End: 2020-03-04

## 2020-03-05 ENCOUNTER — TELEPHONE (OUTPATIENT)
Dept: CARDIOLOGY | Age: 70
End: 2020-03-05

## 2020-03-07 ENCOUNTER — HOSPITAL ENCOUNTER (OUTPATIENT)
Dept: CV DIAGNOSTICS | Facility: HOSPITAL | Age: 70
Discharge: HOME | End: 2020-03-07
Attending: INTERNAL MEDICINE
Payer: MEDICARE

## 2020-03-07 DIAGNOSIS — I42.0 DILATED CARDIOMYOPATHY (HCC): ICD-10-CM

## 2020-03-07 PROCEDURE — 93306 TTE W/DOPPLER COMPLETE: CPT | Performed by: INTERNAL MEDICINE

## 2020-03-10 ENCOUNTER — APPOINTMENT (OUTPATIENT)
Dept: CARDIOLOGY | Age: 70
End: 2020-03-10
Attending: INTERNAL MEDICINE

## 2020-03-12 ENCOUNTER — ANCILLARY PROCEDURE (OUTPATIENT)
Dept: CARDIOLOGY | Age: 70
End: 2020-03-12
Attending: INTERNAL MEDICINE

## 2020-03-12 VITALS
WEIGHT: 165 LBS | BODY MASS INDEX: 23.68 KG/M2 | RESPIRATION RATE: 14 BRPM | DIASTOLIC BLOOD PRESSURE: 70 MMHG | HEART RATE: 62 BPM | SYSTOLIC BLOOD PRESSURE: 138 MMHG

## 2020-03-12 DIAGNOSIS — Z95.810 ICD (IMPLANTABLE CARDIOVERTER-DEFIBRILLATOR) IN PLACE: Primary | ICD-10-CM

## 2020-03-12 PROCEDURE — 93282 PRGRMG EVAL IMPLANTABLE DFB: CPT | Performed by: INTERNAL MEDICINE

## 2020-03-24 PROCEDURE — 93296 REM INTERROG EVL PM/IDS: CPT | Performed by: INTERNAL MEDICINE

## 2020-06-05 RX ORDER — LISINOPRIL 5 MG/1
5 TABLET ORAL DAILY
Qty: 90 TABLET | Refills: 0 | Status: SHIPPED | OUTPATIENT
Start: 2020-06-05 | End: 2020-09-11 | Stop reason: SDUPTHER

## 2020-06-30 PROCEDURE — 93295 DEV INTERROG REMOTE 1/2/MLT: CPT | Performed by: INTERNAL MEDICINE

## 2020-06-30 PROCEDURE — 93296 REM INTERROG EVL PM/IDS: CPT | Performed by: INTERNAL MEDICINE

## 2020-07-01 ENCOUNTER — ANCILLARY PROCEDURE (OUTPATIENT)
Dept: CARDIOLOGY | Age: 70
End: 2020-07-01
Attending: INTERNAL MEDICINE

## 2020-07-01 DIAGNOSIS — Z95.810 ICD (IMPLANTABLE CARDIOVERTER-DEFIBRILLATOR) IN PLACE: ICD-10-CM

## 2020-09-11 RX ORDER — LISINOPRIL 5 MG/1
5 TABLET ORAL DAILY
Qty: 90 TABLET | Refills: 0 | Status: SHIPPED | OUTPATIENT
Start: 2020-09-11

## 2020-10-12 ENCOUNTER — ANCILLARY PROCEDURE (OUTPATIENT)
Dept: CARDIOLOGY | Age: 70
End: 2020-10-12
Attending: INTERNAL MEDICINE

## 2020-10-12 ENCOUNTER — ANCILLARY ORDERS (OUTPATIENT)
Dept: CARDIOLOGY | Age: 70
End: 2020-10-12

## 2020-10-12 DIAGNOSIS — Z95.810 ICD (IMPLANTABLE CARDIOVERTER-DEFIBRILLATOR) IN PLACE: ICD-10-CM

## 2020-10-12 PROCEDURE — 93296 REM INTERROG EVL PM/IDS: CPT | Performed by: INTERNAL MEDICINE

## 2020-10-12 PROCEDURE — X1114 CARDIAC DEVICE HOME CHECK - REMOTE UNSCHEDULED: HCPCS | Performed by: INTERNAL MEDICINE

## 2020-10-12 PROCEDURE — 93295 DEV INTERROG REMOTE 1/2/MLT: CPT | Performed by: INTERNAL MEDICINE

## 2020-12-15 RX ORDER — RIVAROXABAN 20 MG/1
TABLET, FILM COATED ORAL
Qty: 90 TABLET | Refills: 0 | Status: SHIPPED | OUTPATIENT
Start: 2020-12-15 | End: 2021-05-26 | Stop reason: SDUPTHER

## 2021-01-05 ENCOUNTER — TELEPHONE (OUTPATIENT)
Dept: INTERNAL MEDICINE CLINIC | Facility: CLINIC | Age: 71
End: 2021-01-05

## 2021-01-05 RX ORDER — AMLODIPINE BESYLATE 5 MG/1
5 TABLET ORAL DAILY
Qty: 30 TABLET | Refills: 1 | Status: SHIPPED | OUTPATIENT
Start: 2021-01-05 | End: 2021-01-29

## 2021-01-05 RX ORDER — LISINOPRIL 5 MG/1
5 TABLET ORAL NIGHTLY
Qty: 30 TABLET | Refills: 11 | Status: SHIPPED | OUTPATIENT
Start: 2021-01-05 | End: 2021-01-13

## 2021-01-05 NOTE — TELEPHONE ENCOUNTER
Patient called the office and would like a call back from Dr. Lilian Fair to speak with her. Did not elaborate on what it was regarding.

## 2021-01-13 ENCOUNTER — OFFICE VISIT (OUTPATIENT)
Dept: INTERNAL MEDICINE CLINIC | Facility: CLINIC | Age: 71
End: 2021-01-13
Payer: MEDICARE

## 2021-01-13 VITALS
WEIGHT: 164.38 LBS | HEIGHT: 70 IN | BODY MASS INDEX: 23.53 KG/M2 | SYSTOLIC BLOOD PRESSURE: 150 MMHG | OXYGEN SATURATION: 98 % | DIASTOLIC BLOOD PRESSURE: 78 MMHG | HEART RATE: 91 BPM

## 2021-01-13 DIAGNOSIS — I10 ESSENTIAL HYPERTENSION: ICD-10-CM

## 2021-01-13 DIAGNOSIS — Z86.73 HISTORY OF CARDIOEMBOLIC CEREBROVASCULAR ACCIDENT (CVA): ICD-10-CM

## 2021-01-13 DIAGNOSIS — Z12.5 PROSTATE CANCER SCREENING: ICD-10-CM

## 2021-01-13 DIAGNOSIS — Z00.00 MEDICARE ANNUAL WELLNESS VISIT, SUBSEQUENT: Primary | ICD-10-CM

## 2021-01-13 DIAGNOSIS — I42.0 DILATED CARDIOMYOPATHY (HCC): ICD-10-CM

## 2021-01-13 DIAGNOSIS — I48.92 ATRIAL FLUTTER, UNSPECIFIED TYPE (HCC): ICD-10-CM

## 2021-01-13 PROCEDURE — 96160 PT-FOCUSED HLTH RISK ASSMT: CPT | Performed by: INTERNAL MEDICINE

## 2021-01-13 PROCEDURE — 3077F SYST BP >= 140 MM HG: CPT | Performed by: INTERNAL MEDICINE

## 2021-01-13 PROCEDURE — 99397 PER PM REEVAL EST PAT 65+ YR: CPT | Performed by: INTERNAL MEDICINE

## 2021-01-13 PROCEDURE — 3078F DIAST BP <80 MM HG: CPT | Performed by: INTERNAL MEDICINE

## 2021-01-13 PROCEDURE — G0439 PPPS, SUBSEQ VISIT: HCPCS | Performed by: INTERNAL MEDICINE

## 2021-01-13 PROCEDURE — 3008F BODY MASS INDEX DOCD: CPT | Performed by: INTERNAL MEDICINE

## 2021-01-13 RX ORDER — LISINOPRIL 10 MG/1
10 TABLET ORAL DAILY
Qty: 90 TABLET | Refills: 3 | Status: SHIPPED | OUTPATIENT
Start: 2021-01-13 | End: 2022-01-08

## 2021-01-13 NOTE — PROGRESS NOTES
HPI:    Patient ID: Lisa Molina is a 65year old male. Pt here for Medicare annual exam and fu on cardiomyopathy and htn and atrial fibrillation    HPI:   Lisa Molina is a 65year old male who presents for a MA Supervisit.     Brinda Fall/Risk Assessment          Have you fallen in the last 12 months?: 0-No                                        Fall/Risk Scorin          Depression Screening (PHQ-2/PHQ-9): Over the LAST 2 WEEKS                      Advance Directives     Do you Tobacco Use      Smoking status: Never Smoker      Smokeless tobacco: Never Used      Tobacco comment: never smoker    Alcohol use:  Yes      Alcohol/week: 1.0 standard drinks      Types: 1 Cans of beer per week      Comment: 3 drinks per week    Drug use: the back  EXTREMITIES: no cyanosis, clubbing or edema.   NEURO: Oriented times three, cranial nerves are intact, motor and sensory are grossly intact    ASSESSMENT AND OTHER RELEVANT CHRONIC CONDITIONS:   Jesenia Saravia is a 65year old male who prese orders found for this or any previous visit. Update Immunization Activity if applicable    Tetanus No orders found for this or any previous visit.  Update Immunization Activity if applicable        SPECIFIC DISEASE MONITORING Internal Lab or Procedure Exter MCG/ACT Nasal Suspension INSTILL 2 SPRAYS INTO EACH NOSTRIL EVERY DAY 1 Bottle 1   • XARELTO 20 MG Oral Tab Take 20 mg by mouth daily with food.          Allergies:  Seasonal                   PHYSICAL EXAM:   Physical Exam           ASSESSMENT/PLAN:   Medi

## 2021-01-14 RX ORDER — AMLODIPINE BESYLATE 5 MG/1
5 TABLET ORAL DAILY
COMMUNITY
Start: 2021-01-05

## 2021-01-16 ENCOUNTER — LAB ENCOUNTER (OUTPATIENT)
Dept: LAB | Age: 71
End: 2021-01-16
Attending: INTERNAL MEDICINE
Payer: MEDICARE

## 2021-01-16 LAB
ALBUMIN SERPL-MCNC: 3.9 G/DL (ref 3.4–5)
ALBUMIN/GLOB SERPL: 1.1 {RATIO} (ref 1–2)
ALP LIVER SERPL-CCNC: 79 U/L
ALT SERPL-CCNC: 34 U/L
ANION GAP SERPL CALC-SCNC: 1 MMOL/L (ref 0–18)
AST SERPL-CCNC: 19 U/L (ref 15–37)
BASOPHILS # BLD AUTO: 0.1 X10(3) UL (ref 0–0.2)
BASOPHILS NFR BLD AUTO: 1.6 %
BILIRUB SERPL-MCNC: 1.9 MG/DL (ref 0.1–2)
BUN BLD-MCNC: 23 MG/DL (ref 7–18)
BUN/CREAT SERPL: 23 (ref 10–20)
CALCIUM BLD-MCNC: 9.9 MG/DL (ref 8.5–10.1)
CHLORIDE SERPL-SCNC: 108 MMOL/L (ref 98–112)
CHOLEST SMN-MCNC: 159 MG/DL (ref ?–200)
CO2 SERPL-SCNC: 31 MMOL/L (ref 21–32)
CREAT BLD-MCNC: 1 MG/DL
DEPRECATED RDW RBC AUTO: 56.5 FL (ref 35.1–46.3)
EOSINOPHIL # BLD AUTO: 0.42 X10(3) UL (ref 0–0.7)
EOSINOPHIL NFR BLD AUTO: 6.8 %
ERYTHROCYTE [DISTWIDTH] IN BLOOD BY AUTOMATED COUNT: 15.4 % (ref 11–15)
GLOBULIN PLAS-MCNC: 3.5 G/DL (ref 2.8–4.4)
GLUCOSE BLD-MCNC: 87 MG/DL (ref 70–99)
HCT VFR BLD AUTO: 53.9 %
HDLC SERPL-MCNC: 41 MG/DL (ref 40–59)
HGB BLD-MCNC: 17.5 G/DL
IMM GRANULOCYTES # BLD AUTO: 0.04 X10(3) UL (ref 0–1)
IMM GRANULOCYTES NFR BLD: 0.6 %
LDLC SERPL CALC-MCNC: 105 MG/DL (ref ?–100)
LYMPHOCYTES # BLD AUTO: 1.61 X10(3) UL (ref 1–4)
LYMPHOCYTES NFR BLD AUTO: 25.9 %
M PROTEIN MFR SERPL ELPH: 7.4 G/DL (ref 6.4–8.2)
MCH RBC QN AUTO: 32.8 PG (ref 26–34)
MCHC RBC AUTO-ENTMCNC: 32.5 G/DL (ref 31–37)
MCV RBC AUTO: 101.1 FL
MONOCYTES # BLD AUTO: 0.56 X10(3) UL (ref 0.1–1)
MONOCYTES NFR BLD AUTO: 9 %
NEUTROPHILS # BLD AUTO: 3.49 X10 (3) UL (ref 1.5–7.7)
NEUTROPHILS # BLD AUTO: 3.49 X10(3) UL (ref 1.5–7.7)
NEUTROPHILS NFR BLD AUTO: 56.1 %
NONHDLC SERPL-MCNC: 118 MG/DL (ref ?–130)
OSMOLALITY SERPL CALC.SUM OF ELEC: 293 MOSM/KG (ref 275–295)
PATIENT FASTING Y/N/NP: YES
PATIENT FASTING Y/N/NP: YES
PLATELET # BLD AUTO: 215 10(3)UL (ref 150–450)
POTASSIUM SERPL-SCNC: 4.9 MMOL/L (ref 3.5–5.1)
PSA SERPL-MCNC: 2.53 NG/ML (ref ?–4)
RBC # BLD AUTO: 5.33 X10(6)UL
SODIUM SERPL-SCNC: 140 MMOL/L (ref 136–145)
TRIGL SERPL-MCNC: 66 MG/DL (ref 30–149)
VLDLC SERPL CALC-MCNC: 13 MG/DL (ref 0–30)
WBC # BLD AUTO: 6.2 X10(3) UL (ref 4–11)

## 2021-01-16 PROCEDURE — 85025 COMPLETE CBC W/AUTO DIFF WBC: CPT | Performed by: INTERNAL MEDICINE

## 2021-01-16 PROCEDURE — 36415 COLL VENOUS BLD VENIPUNCTURE: CPT | Performed by: INTERNAL MEDICINE

## 2021-01-16 PROCEDURE — 80061 LIPID PANEL: CPT | Performed by: INTERNAL MEDICINE

## 2021-01-16 PROCEDURE — 84153 ASSAY OF PSA TOTAL: CPT | Performed by: INTERNAL MEDICINE

## 2021-01-16 PROCEDURE — 80053 COMPREHEN METABOLIC PANEL: CPT | Performed by: INTERNAL MEDICINE

## 2021-01-18 PROCEDURE — 93295 DEV INTERROG REMOTE 1/2/MLT: CPT | Performed by: INTERNAL MEDICINE

## 2021-01-18 PROCEDURE — 93296 REM INTERROG EVL PM/IDS: CPT | Performed by: INTERNAL MEDICINE

## 2021-01-19 ENCOUNTER — OFFICE VISIT (OUTPATIENT)
Dept: CARDIOLOGY | Age: 71
End: 2021-01-19

## 2021-01-19 VITALS
SYSTOLIC BLOOD PRESSURE: 130 MMHG | HEIGHT: 70 IN | BODY MASS INDEX: 23.68 KG/M2 | HEART RATE: 60 BPM | OXYGEN SATURATION: 97 % | DIASTOLIC BLOOD PRESSURE: 80 MMHG

## 2021-01-19 DIAGNOSIS — I44.2 CHB (COMPLETE HEART BLOCK) (CMD): Primary | ICD-10-CM

## 2021-01-19 DIAGNOSIS — Z45.02 IMPLANTABLE DEFIBRILLATOR REPROGRAMMING/CHECK: ICD-10-CM

## 2021-01-19 DIAGNOSIS — I48.20 CHRONIC ATRIAL FIBRILLATION (CMD): ICD-10-CM

## 2021-01-19 DIAGNOSIS — E78.00 PURE HYPERCHOLESTEROLEMIA: ICD-10-CM

## 2021-01-19 DIAGNOSIS — I50.22 HEART FAILURE, CHRONIC SYSTOLIC (CMD): ICD-10-CM

## 2021-01-19 PROCEDURE — 99214 OFFICE O/P EST MOD 30 MIN: CPT | Performed by: INTERNAL MEDICINE

## 2021-01-19 ASSESSMENT — PATIENT HEALTH QUESTIONNAIRE - PHQ9
2. FEELING DOWN, DEPRESSED OR HOPELESS: NOT AT ALL
SUM OF ALL RESPONSES TO PHQ9 QUESTIONS 1 AND 2: 0
CLINICAL INTERPRETATION OF PHQ2 SCORE: NO FURTHER SCREENING NEEDED
CLINICAL INTERPRETATION OF PHQ9 SCORE: NO FURTHER SCREENING NEEDED
1. LITTLE INTEREST OR PLEASURE IN DOING THINGS: NOT AT ALL
SUM OF ALL RESPONSES TO PHQ9 QUESTIONS 1 AND 2: 0

## 2021-01-20 ENCOUNTER — ANCILLARY ORDERS (OUTPATIENT)
Dept: CARDIOLOGY | Age: 71
End: 2021-01-20

## 2021-01-20 ENCOUNTER — ANCILLARY PROCEDURE (OUTPATIENT)
Dept: CARDIOLOGY | Age: 71
End: 2021-01-20
Attending: INTERNAL MEDICINE

## 2021-01-20 DIAGNOSIS — Z95.810 ICD (IMPLANTABLE CARDIOVERTER-DEFIBRILLATOR) IN PLACE: ICD-10-CM

## 2021-01-20 PROCEDURE — X1114 CARDIAC DEVICE HOME CHECK - REMOTE UNSCHEDULED: HCPCS | Performed by: INTERNAL MEDICINE

## 2021-01-21 ENCOUNTER — HOSPITAL ENCOUNTER (OUTPATIENT)
Dept: ULTRASOUND IMAGING | Age: 71
Discharge: HOME | End: 2021-01-21
Attending: INTERNAL MEDICINE
Payer: MEDICARE

## 2021-01-21 DIAGNOSIS — Z86.73 HISTORY OF CARDIOEMBOLIC CEREBROVASCULAR ACCIDENT (CVA): ICD-10-CM

## 2021-01-21 PROCEDURE — 93880 EXTRACRANIAL BILAT STUDY: CPT | Performed by: INTERNAL MEDICINE

## 2021-01-25 ENCOUNTER — HOSPITAL ENCOUNTER (OUTPATIENT)
Dept: CV DIAGNOSTICS | Facility: HOSPITAL | Age: 71
Discharge: HOME | End: 2021-01-25
Attending: INTERNAL MEDICINE
Payer: MEDICARE

## 2021-01-25 DIAGNOSIS — I50.22 HEART FAILURE, CHRONIC SYSTOLIC (HCC): ICD-10-CM

## 2021-01-25 LAB — LV EF: 37.5 %

## 2021-01-25 PROCEDURE — 93306 TTE W/DOPPLER COMPLETE: CPT | Performed by: INTERNAL MEDICINE

## 2021-01-26 ENCOUNTER — CLINICAL ABSTRACT (OUTPATIENT)
Dept: CARDIOLOGY | Age: 71
End: 2021-01-26

## 2021-01-26 ENCOUNTER — TELEPHONE (OUTPATIENT)
Dept: CARDIOLOGY | Age: 71
End: 2021-01-26

## 2021-01-29 RX ORDER — AMLODIPINE BESYLATE 5 MG/1
TABLET ORAL
Qty: 30 TABLET | Refills: 1 | Status: SHIPPED | OUTPATIENT
Start: 2021-01-29 | End: 2021-04-23

## 2021-02-15 ENCOUNTER — TELEPHONE (OUTPATIENT)
Dept: CARDIOLOGY | Age: 71
End: 2021-02-15

## 2021-03-04 DIAGNOSIS — Z23 NEED FOR VACCINATION: ICD-10-CM

## 2021-03-15 DIAGNOSIS — Z23 NEED FOR VACCINATION: ICD-10-CM

## 2021-04-22 PROCEDURE — 93295 DEV INTERROG REMOTE 1/2/MLT: CPT | Performed by: INTERNAL MEDICINE

## 2021-04-22 PROCEDURE — 93296 REM INTERROG EVL PM/IDS: CPT | Performed by: INTERNAL MEDICINE

## 2021-04-23 ENCOUNTER — OFFICE VISIT (OUTPATIENT)
Dept: INTERNAL MEDICINE CLINIC | Facility: CLINIC | Age: 71
End: 2021-04-23
Payer: MEDICARE

## 2021-04-23 ENCOUNTER — ANCILLARY PROCEDURE (OUTPATIENT)
Dept: CARDIOLOGY | Age: 71
End: 2021-04-23
Attending: INTERNAL MEDICINE

## 2021-04-23 ENCOUNTER — ANCILLARY ORDERS (OUTPATIENT)
Dept: CARDIOLOGY | Age: 71
End: 2021-04-23

## 2021-04-23 ENCOUNTER — TELEPHONE (OUTPATIENT)
Dept: CARDIOLOGY | Age: 71
End: 2021-04-23

## 2021-04-23 VITALS
DIASTOLIC BLOOD PRESSURE: 84 MMHG | SYSTOLIC BLOOD PRESSURE: 134 MMHG | BODY MASS INDEX: 23.34 KG/M2 | WEIGHT: 163 LBS | HEIGHT: 70 IN

## 2021-04-23 DIAGNOSIS — I10 ESSENTIAL HYPERTENSION: ICD-10-CM

## 2021-04-23 DIAGNOSIS — I48.92 ATRIAL FLUTTER, UNSPECIFIED TYPE (HCC): ICD-10-CM

## 2021-04-23 DIAGNOSIS — Z95.810 ICD (IMPLANTABLE CARDIOVERTER-DEFIBRILLATOR) IN PLACE: ICD-10-CM

## 2021-04-23 DIAGNOSIS — I42.0 DILATED CARDIOMYOPATHY (HCC): Primary | ICD-10-CM

## 2021-04-23 PROCEDURE — 3079F DIAST BP 80-89 MM HG: CPT | Performed by: INTERNAL MEDICINE

## 2021-04-23 PROCEDURE — 3008F BODY MASS INDEX DOCD: CPT | Performed by: INTERNAL MEDICINE

## 2021-04-23 PROCEDURE — 3075F SYST BP GE 130 - 139MM HG: CPT | Performed by: INTERNAL MEDICINE

## 2021-04-23 PROCEDURE — 99214 OFFICE O/P EST MOD 30 MIN: CPT | Performed by: INTERNAL MEDICINE

## 2021-04-23 PROCEDURE — X1114 CARDIAC DEVICE HOME CHECK - REMOTE UNSCHEDULED: HCPCS | Performed by: INTERNAL MEDICINE

## 2021-04-23 RX ORDER — MONTELUKAST SODIUM 10 MG/1
10 TABLET ORAL DAILY
Qty: 90 TABLET | Refills: 3 | Status: SHIPPED | OUTPATIENT
Start: 2021-04-23 | End: 2022-01-24 | Stop reason: ALTCHOICE

## 2021-04-23 NOTE — PROGRESS NOTES
HPI/Subjective:   Patient ID: Jasmin Bruce is a 65year old male. HPIPt here for evaluation of seasonal allergies and for advice on covid vaccination.   Bp has been well controlled    History/Other:   Review of Systems   HENT: Positive for postna Prescriptions      No prescriptions requested or ordered in this encounter       Imaging & Referrals:  None

## 2021-05-27 ENCOUNTER — ANCILLARY PROCEDURE (OUTPATIENT)
Dept: CARDIOLOGY | Age: 71
End: 2021-05-27
Attending: INTERNAL MEDICINE

## 2021-05-27 VITALS
SYSTOLIC BLOOD PRESSURE: 150 MMHG | WEIGHT: 162 LBS | RESPIRATION RATE: 14 BRPM | HEART RATE: 70 BPM | DIASTOLIC BLOOD PRESSURE: 80 MMHG | BODY MASS INDEX: 23.24 KG/M2

## 2021-05-27 DIAGNOSIS — Z95.810 ICD (IMPLANTABLE CARDIOVERTER-DEFIBRILLATOR) IN PLACE: Primary | ICD-10-CM

## 2021-05-27 PROCEDURE — 93290 INTERROG DEV EVAL ICPMS IP: CPT | Performed by: INTERNAL MEDICINE

## 2021-05-27 PROCEDURE — 93283 PRGRMG EVAL IMPLANTABLE DFB: CPT | Performed by: INTERNAL MEDICINE

## 2021-11-29 ENCOUNTER — OFFICE VISIT (OUTPATIENT)
Dept: INTERNAL MEDICINE CLINIC | Facility: CLINIC | Age: 71
End: 2021-11-29
Payer: MEDICARE

## 2021-11-29 VITALS
BODY MASS INDEX: 23.34 KG/M2 | HEART RATE: 59 BPM | OXYGEN SATURATION: 96 % | SYSTOLIC BLOOD PRESSURE: 142 MMHG | WEIGHT: 163 LBS | DIASTOLIC BLOOD PRESSURE: 80 MMHG | HEIGHT: 70 IN

## 2021-11-29 DIAGNOSIS — I10 ESSENTIAL HYPERTENSION: ICD-10-CM

## 2021-11-29 DIAGNOSIS — I48.92 ATRIAL FLUTTER, UNSPECIFIED TYPE (HCC): ICD-10-CM

## 2021-11-29 DIAGNOSIS — Z12.5 PROSTATE CANCER SCREENING: ICD-10-CM

## 2021-11-29 DIAGNOSIS — R05.8 COUGH DUE TO ACE INHIBITOR: ICD-10-CM

## 2021-11-29 DIAGNOSIS — T46.4X5A COUGH DUE TO ACE INHIBITOR: ICD-10-CM

## 2021-11-29 DIAGNOSIS — Z12.11 COLON CANCER SCREENING: ICD-10-CM

## 2021-11-29 DIAGNOSIS — I42.0 DILATED CARDIOMYOPATHY (HCC): Primary | ICD-10-CM

## 2021-11-29 PROCEDURE — 3079F DIAST BP 80-89 MM HG: CPT | Performed by: INTERNAL MEDICINE

## 2021-11-29 PROCEDURE — 3077F SYST BP >= 140 MM HG: CPT | Performed by: INTERNAL MEDICINE

## 2021-11-29 PROCEDURE — 3008F BODY MASS INDEX DOCD: CPT | Performed by: INTERNAL MEDICINE

## 2021-11-29 PROCEDURE — 99214 OFFICE O/P EST MOD 30 MIN: CPT | Performed by: INTERNAL MEDICINE

## 2021-11-29 NOTE — PROGRESS NOTES
Subjective:   Patient ID: Baby Files is a 66year old male. HPIPt here for a 6 month check up and to address a dry cough/ Ace induced ? ? Has good stamiona and endurance .  No cardiac symptoms    History/Other:   Review of Systems   Constitutio cardiomyopathy ([facility]  (primary encounter diagnosis) compensated  Atrial flutter, unspecified type ([facility] on xarelto  Essential hypertension controlled  Cough due to ACE inhibitor - to discuss possible chamge with Dr. Ly Savage    No orders of the defined types

## 2021-12-07 ENCOUNTER — TELEPHONE (OUTPATIENT)
Dept: GASTROENTEROLOGY | Facility: CLINIC | Age: 71
End: 2021-12-07

## 2021-12-07 NOTE — TELEPHONE ENCOUNTER
Last Procedure, Date, MD:  08/30/2011, colonoscopy w/Dr Case Figueroa  Last Diagnosis:  Normal colonoscopy   Recalled for (mth/yrs): 10 years  Sedation used previously:  IV  Last Prep Used (if known):  ?  Quality of prep (if known): adequate  Anticoagulants:Xarelt

## 2021-12-07 NOTE — TELEPHONE ENCOUNTER
Jelani Kendrick MD   Physician   Specialty:  GASTROENTEROLOGY   Operative Report   Unsigned Transcription   Encounter Date:  8/17/2011                 Unsigned Transcription        Patient:   Sharmila LEVIN #:   [id number]                    R In discussion with Dr. Sobeida Barrera, they were going to   call in     Lopressor for the patient to take tonight to continue to   control his     rate for his surgery tomorrow. 2. Symptomatic treatment of hemorrhoids.   3. Consideration for repeat colonoscopy with

## 2021-12-08 NOTE — TELEPHONE ENCOUNTER
Nursing: To clarify is the cardiac history new since the patient was seen by Dr. Janis Kenny in 2011? If so, then she should be seen in the office.   If her cardiac history/Xarelto predates the colonoscopy and is stable, then I am happy to provide orders

## 2021-12-08 NOTE — TELEPHONE ENCOUNTER
Patient contacted and states he had a pacemaker  placed after his colonoscopy in 2011 and he is currently on a blood thinner. Appointment made with Edilia Ford on 01/24/2022 for colon screening.

## 2022-01-03 ENCOUNTER — HOSPITAL ENCOUNTER (OUTPATIENT)
Age: 72
Discharge: HOME | End: 2022-01-03
Payer: MEDICARE

## 2022-01-03 ENCOUNTER — APPOINTMENT (OUTPATIENT)
Dept: GENERAL RADIOLOGY | Age: 72
End: 2022-01-03
Attending: EMERGENCY MEDICINE
Payer: MEDICARE

## 2022-01-03 VITALS
DIASTOLIC BLOOD PRESSURE: 59 MMHG | SYSTOLIC BLOOD PRESSURE: 127 MMHG | HEIGHT: 71 IN | OXYGEN SATURATION: 97 % | TEMPERATURE: 100 F | WEIGHT: 160 LBS | RESPIRATION RATE: 18 BRPM | BODY MASS INDEX: 22.4 KG/M2 | HEART RATE: 60 BPM

## 2022-01-03 DIAGNOSIS — U07.1 COVID-19: ICD-10-CM

## 2022-01-03 DIAGNOSIS — Z20.822 ENCOUNTER FOR LABORATORY TESTING FOR COVID-19 VIRUS: ICD-10-CM

## 2022-01-03 DIAGNOSIS — R50.9 FEVER, UNSPECIFIED FEVER CAUSE: Primary | ICD-10-CM

## 2022-01-03 LAB
POCT INFLUENZA A: NEGATIVE
POCT INFLUENZA B: NEGATIVE
S PYO AG THROAT QL: NEGATIVE

## 2022-01-03 PROCEDURE — 87502 INFLUENZA DNA AMP PROBE: CPT | Performed by: EMERGENCY MEDICINE

## 2022-01-03 PROCEDURE — A9150 MISC/EXPER NON-PRESCRIPT DRU: HCPCS | Performed by: EMERGENCY MEDICINE

## 2022-01-03 PROCEDURE — 87880 STREP A ASSAY W/OPTIC: CPT | Performed by: EMERGENCY MEDICINE

## 2022-01-03 PROCEDURE — 71046 X-RAY EXAM CHEST 2 VIEWS: CPT | Performed by: EMERGENCY MEDICINE

## 2022-01-03 PROCEDURE — 99213 OFFICE O/P EST LOW 20 MIN: CPT | Performed by: EMERGENCY MEDICINE

## 2022-01-03 RX ORDER — ACETAMINOPHEN 500 MG
1000 TABLET ORAL ONCE
Status: COMPLETED | OUTPATIENT
Start: 2022-01-03 | End: 2022-01-03

## 2022-01-03 RX ORDER — ACETAMINOPHEN 500 MG
1000 TABLET ORAL EVERY 6 HOURS PRN
Qty: 100 TABLET | Refills: 0 | Status: SHIPPED | OUTPATIENT
Start: 2022-01-03 | End: 2022-01-10

## 2022-01-03 NOTE — ED PROVIDER NOTES
Patient Seen in: [facility]      History   Patient presents with:  Sore Throat  Fever    Stated Complaint: VWR:[phone]    Subjective:   HPI  Faith Fermin is a 66year old male here for dry cough and sore throat.  Sx worse at night Musculoskeletal:      Cervical back: Normal range of motion. No erythema or rigidity. No pain with movement, spinous process tenderness or muscular tenderness. Normal range of motion. Lymphadenopathy:      Cervical: No cervical adenopathy.       Right c illness. · Staying hydrated. Drinking liquids is the best way to prevent dehydration. Try to drink 6 to 8 glasses of liquids every day, or as advised by your provider. Also check with your provider about which fluids are best for you.  Don't drink fluids Bette Lowe, [address] [facility] [phone]    Call in 3 days  follow up.           Medications Prescribed:  Current Discharge Medication List    START taking these medications    acetaminophen 500 MG Oral Tab  Take 2 tablets (1,000 mg t

## 2022-01-03 NOTE — ED INITIAL ASSESSMENT (HPI)
Pt here w c/o sore throat, painful swallowing, cough, chills x yesterday. Pt's wife was positive for covid.

## 2022-01-05 LAB — SARS-COV-2 RNA RESP QL NAA+PROBE: DETECTED

## 2022-01-10 NOTE — H&P
[address] [facility]    Patient presen hyperlipidemia       Past Surgical History:   Procedure Laterality Date   • CARDIAC DEFIBRILLATOR PLACEMENT     • CARDIAC PACEMAKER PLACEMENT     • HERNIA SURGERY Right 1/7/15    inguinal       Family History   Problem Relation Age of Onset   • Lipids Moth well perfused   Lung: effort normal and breath sounds normal, no respiratory distress, wheezes or rales  GI: soft, non-tender exam in all quadrants without rigidity or guarding, non-distended, no abnormal bowel sounds noted, no masses are palpated  : no ACE/ARBs the night before and/or the day of the procedure(s) - N/A   - NO alcohol, recreational drugs nor erectile dysfunction medications 24 hours before procedure(s)   - NO herbal supplements or weight loss medications (phentermine/Vyvanse/Adderall)  x 7

## 2022-01-11 ENCOUNTER — TELEPHONE (OUTPATIENT)
Dept: INTERNAL MEDICINE CLINIC | Facility: CLINIC | Age: 72
End: 2022-01-11

## 2022-01-11 NOTE — TELEPHONE ENCOUNTER
Continues have bad cough. Moist, with phlegm output. opague white to beige. Can he have something to help? O2% Sat around 94%.   Afraid this can lead to pneumonia    Sabine some people get antibiotics to get them through this (?) Informed him that's not a

## 2022-01-15 ENCOUNTER — LAB ENCOUNTER (OUTPATIENT)
Dept: LAB | Facility: HOSPITAL | Age: 72
End: 2022-01-15
Attending: INTERNAL MEDICINE
Payer: MEDICARE

## 2022-01-15 DIAGNOSIS — C08.9: Primary | ICD-10-CM

## 2022-01-15 LAB
ALBUMIN SERPL-MCNC: 3.8 G/DL (ref 3.4–5)
ALBUMIN/GLOB SERPL: 1.1 {RATIO} (ref 1–2)
ALP LIVER SERPL-CCNC: 69 U/L
ALT SERPL-CCNC: 23 U/L
ANION GAP SERPL CALC-SCNC: 3 MMOL/L (ref 0–18)
AST SERPL-CCNC: 18 U/L (ref 15–37)
BASOPHILS # BLD AUTO: 0.07 X10(3) UL (ref 0–0.2)
BASOPHILS NFR BLD AUTO: 0.9 %
BILIRUB SERPL-MCNC: 1.6 MG/DL (ref 0.1–2)
BUN BLD-MCNC: 23 MG/DL (ref 7–18)
BUN/CREAT SERPL: 21.7 (ref 10–20)
CALCIUM BLD-MCNC: 10.2 MG/DL (ref 8.5–10.1)
CHLORIDE SERPL-SCNC: 105 MMOL/L (ref 98–112)
CHOLEST SERPL-MCNC: 164 MG/DL (ref ?–200)
CO2 SERPL-SCNC: 32 MMOL/L (ref 21–32)
CREAT BLD-MCNC: 1.06 MG/DL
DEPRECATED RDW RBC AUTO: 54.3 FL (ref 35.1–46.3)
EOSINOPHIL # BLD AUTO: 0.41 X10(3) UL (ref 0–0.7)
EOSINOPHIL NFR BLD AUTO: 5.2 %
ERYTHROCYTE [DISTWIDTH] IN BLOOD BY AUTOMATED COUNT: 14.8 % (ref 11–15)
FASTING PATIENT LIPID ANSWER: YES
FASTING STATUS PATIENT QL REPORTED: YES
GLOBULIN PLAS-MCNC: 3.4 G/DL (ref 2.8–4.4)
GLUCOSE BLD-MCNC: 88 MG/DL (ref 70–99)
HCT VFR BLD AUTO: 51.8 %
HDLC SERPL-MCNC: 36 MG/DL (ref 40–59)
HGB BLD-MCNC: 16.4 G/DL
IMM GRANULOCYTES # BLD AUTO: 0.09 X10(3) UL (ref 0–1)
IMM GRANULOCYTES NFR BLD: 1.1 %
LDLC SERPL CALC-MCNC: 114 MG/DL (ref ?–100)
LYMPHOCYTES # BLD AUTO: 1.57 X10(3) UL (ref 1–4)
LYMPHOCYTES NFR BLD AUTO: 20 %
MCH RBC QN AUTO: 31.4 PG (ref 26–34)
MCHC RBC AUTO-ENTMCNC: 31.7 G/DL (ref 31–37)
MCV RBC AUTO: 99.2 FL
MONOCYTES # BLD AUTO: 0.84 X10(3) UL (ref 0.1–1)
MONOCYTES NFR BLD AUTO: 10.7 %
NEUTROPHILS # BLD AUTO: 4.88 X10 (3) UL (ref 1.5–7.7)
NEUTROPHILS # BLD AUTO: 4.88 X10(3) UL (ref 1.5–7.7)
NEUTROPHILS NFR BLD AUTO: 62.1 %
NONHDLC SERPL-MCNC: 128 MG/DL (ref ?–130)
OSMOLALITY SERPL CALC.SUM OF ELEC: 293 MOSM/KG (ref 275–295)
PLATELET # BLD AUTO: 196 10(3)UL (ref 150–450)
POTASSIUM SERPL-SCNC: 5.2 MMOL/L (ref 3.5–5.1)
PROT SERPL-MCNC: 7.2 G/DL (ref 6.4–8.2)
PSA SERPL-MCNC: 3.01 NG/ML (ref ?–4)
RBC # BLD AUTO: 5.22 X10(6)UL
SODIUM SERPL-SCNC: 140 MMOL/L (ref 136–145)
TRIGL SERPL-MCNC: 74 MG/DL (ref 30–149)
VLDLC SERPL CALC-MCNC: 13 MG/DL (ref 0–30)
WBC # BLD AUTO: 7.9 X10(3) UL (ref 4–11)

## 2022-01-15 PROCEDURE — 80061 LIPID PANEL: CPT

## 2022-01-15 PROCEDURE — 84153 ASSAY OF PSA TOTAL: CPT | Performed by: INTERNAL MEDICINE

## 2022-01-15 PROCEDURE — 36415 COLL VENOUS BLD VENIPUNCTURE: CPT | Performed by: INTERNAL MEDICINE

## 2022-01-15 PROCEDURE — 80053 COMPREHEN METABOLIC PANEL: CPT | Performed by: INTERNAL MEDICINE

## 2022-01-15 PROCEDURE — 85025 COMPLETE CBC W/AUTO DIFF WBC: CPT | Performed by: INTERNAL MEDICINE

## 2022-01-24 ENCOUNTER — TELEPHONE (OUTPATIENT)
Dept: GASTROENTEROLOGY | Facility: CLINIC | Age: 72
End: 2022-01-24

## 2022-01-24 ENCOUNTER — OFFICE VISIT (OUTPATIENT)
Dept: GASTROENTEROLOGY | Facility: CLINIC | Age: 72
End: 2022-01-24
Payer: MEDICARE

## 2022-01-24 VITALS
SYSTOLIC BLOOD PRESSURE: 150 MMHG | DIASTOLIC BLOOD PRESSURE: 77 MMHG | BODY MASS INDEX: 23.1 KG/M2 | WEIGHT: 165 LBS | HEART RATE: 64 BPM | HEIGHT: 71 IN

## 2022-01-24 DIAGNOSIS — I48.91 ATRIAL FIBRILLATION, UNSPECIFIED TYPE (HCC): ICD-10-CM

## 2022-01-24 DIAGNOSIS — Z12.11 SCREENING FOR COLON CANCER: Primary | ICD-10-CM

## 2022-01-24 DIAGNOSIS — Z12.11 SCREEN FOR COLON CANCER: Primary | ICD-10-CM

## 2022-01-24 PROCEDURE — 3008F BODY MASS INDEX DOCD: CPT | Performed by: NURSE PRACTITIONER

## 2022-01-24 PROCEDURE — 3077F SYST BP >= 140 MM HG: CPT | Performed by: NURSE PRACTITIONER

## 2022-01-24 PROCEDURE — 99203 OFFICE O/P NEW LOW 30 MIN: CPT | Performed by: NURSE PRACTITIONER

## 2022-01-24 PROCEDURE — 3078F DIAST BP <80 MM HG: CPT | Performed by: NURSE PRACTITIONER

## 2022-01-24 RX ORDER — LISINOPRIL 10 MG/1
TABLET ORAL
COMMUNITY
Start: 2018-07-21 | End: 2022-02-05

## 2022-01-24 RX ORDER — POLYETHYLENE GLYCOL 3350, SODIUM CHLORIDE, SODIUM BICARBONATE, POTASSIUM CHLORIDE 420; 11.2; 5.72; 1.48 G/4L; G/4L; G/4L; G/4L
POWDER, FOR SOLUTION ORAL
Qty: 4000 ML | Refills: 0 | Status: SHIPPED | OUTPATIENT
Start: 2022-01-24

## 2022-01-24 NOTE — PATIENT INSTRUCTIONS
-Schedule colonoscopy w/ Dr. Jaja Bennett with MAC due to medical history  Dx: screening   -Eligible for NE: No r/t medical history  -Prep: Split dose Colyte/TriLyte or equivalent  -Anti-platelets and anti-coagulants: Xarelto (Please contact prescribing MD (Dr. Fatemeh Cao

## 2022-01-24 NOTE — TELEPHONE ENCOUNTER
Scheduled for:  Colonoscopy [address]31  Provider Name:  Dr Immanuel Jean  Date:  Friday, 04/08/2022  Location:  [facility]  Sedation:  MAC  Time:  3:15pm ( pt is aware arrival time is at 2:15pm)  Prep:  Trilyte   Meds/Allergies Reconciled?:  TAMMY Dave Reviewed  Diagnosis w

## 2022-02-05 RX ORDER — LISINOPRIL 10 MG/1
TABLET ORAL
Qty: 90 TABLET | Refills: 3 | Status: SHIPPED | OUTPATIENT
Start: 2022-02-05

## 2022-03-21 ENCOUNTER — OFFICE VISIT (OUTPATIENT)
Dept: INTERNAL MEDICINE CLINIC | Facility: CLINIC | Age: 72
End: 2022-03-21
Payer: MEDICARE

## 2022-03-21 VITALS
WEIGHT: 166 LBS | OXYGEN SATURATION: 97 % | SYSTOLIC BLOOD PRESSURE: 140 MMHG | BODY MASS INDEX: 23.24 KG/M2 | HEIGHT: 71 IN | HEART RATE: 60 BPM | DIASTOLIC BLOOD PRESSURE: 80 MMHG

## 2022-03-21 DIAGNOSIS — I42.0 DILATED CARDIOMYOPATHY (HCC): ICD-10-CM

## 2022-03-21 DIAGNOSIS — Z00.00 MEDICARE ANNUAL WELLNESS VISIT, SUBSEQUENT: Primary | ICD-10-CM

## 2022-03-21 DIAGNOSIS — Z12.5 PROSTATE CANCER SCREENING: ICD-10-CM

## 2022-03-21 DIAGNOSIS — I48.92 ATRIAL FLUTTER, UNSPECIFIED TYPE (HCC): ICD-10-CM

## 2022-03-21 DIAGNOSIS — I10 ESSENTIAL HYPERTENSION: ICD-10-CM

## 2022-03-21 DIAGNOSIS — H25.019 CORTICAL AGE-RELATED CATARACT, UNSPECIFIED LATERALITY: ICD-10-CM

## 2022-03-21 PROCEDURE — 99397 PER PM REEVAL EST PAT 65+ YR: CPT | Performed by: INTERNAL MEDICINE

## 2022-03-21 PROCEDURE — 3008F BODY MASS INDEX DOCD: CPT | Performed by: INTERNAL MEDICINE

## 2022-03-21 PROCEDURE — 3079F DIAST BP 80-89 MM HG: CPT | Performed by: INTERNAL MEDICINE

## 2022-03-21 PROCEDURE — 3077F SYST BP >= 140 MM HG: CPT | Performed by: INTERNAL MEDICINE

## 2022-03-21 PROCEDURE — G0439 PPPS, SUBSEQ VISIT: HCPCS | Performed by: INTERNAL MEDICINE

## 2022-03-21 PROCEDURE — 96160 PT-FOCUSED HLTH RISK ASSMT: CPT | Performed by: INTERNAL MEDICINE

## 2022-03-30 NOTE — PAT NURSING NOTE
Confirmed date and time of colon procedure   To be off elequis 2 days prior   Will hold lisinopril med day of procedure

## 2022-03-31 PROBLEM — I50.22 CHRONIC SYSTOLIC HEART FAILURE (HCC): Chronic | Status: RESOLVED | Noted: 2018-10-26 | Resolved: 2022-03-31

## 2022-04-08 ENCOUNTER — HOSPITAL ENCOUNTER (OUTPATIENT)
Facility: HOSPITAL | Age: 72
Setting detail: HOSPITAL OUTPATIENT SURGERY
Discharge: HOME | End: 2022-04-08
Attending: INTERNAL MEDICINE | Admitting: INTERNAL MEDICINE
Payer: MEDICARE

## 2022-04-08 ENCOUNTER — ANESTHESIA (OUTPATIENT)
Dept: ENDOSCOPY | Facility: HOSPITAL | Age: 72
End: 2022-04-08
Payer: MEDICARE

## 2022-04-08 ENCOUNTER — ANESTHESIA EVENT (OUTPATIENT)
Dept: ENDOSCOPY | Facility: HOSPITAL | Age: 72
End: 2022-04-08
Payer: MEDICARE

## 2022-04-08 VITALS
HEIGHT: 70 IN | BODY MASS INDEX: 22.9 KG/M2 | OXYGEN SATURATION: 94 % | TEMPERATURE: 98 F | WEIGHT: 160 LBS | RESPIRATION RATE: 21 BRPM | SYSTOLIC BLOOD PRESSURE: 148 MMHG | HEART RATE: 60 BPM | DIASTOLIC BLOOD PRESSURE: 91 MMHG

## 2022-04-08 DIAGNOSIS — Z12.11 SCREEN FOR COLON CANCER: ICD-10-CM

## 2022-04-08 PROCEDURE — 0DBL8ZX EXCISION OF TRANSVERSE COLON, VIA NATURAL OR ARTIFICIAL OPENING ENDOSCOPIC, DIAGNOSTIC: ICD-10-PCS | Performed by: INTERNAL MEDICINE

## 2022-04-08 PROCEDURE — 0DBP8ZX EXCISION OF RECTUM, VIA NATURAL OR ARTIFICIAL OPENING ENDOSCOPIC, DIAGNOSTIC: ICD-10-PCS | Performed by: INTERNAL MEDICINE

## 2022-04-08 PROCEDURE — 88305 TISSUE EXAM BY PATHOLOGIST: CPT | Performed by: INTERNAL MEDICINE

## 2022-04-08 PROCEDURE — 0DBK8ZX EXCISION OF ASCENDING COLON, VIA NATURAL OR ARTIFICIAL OPENING ENDOSCOPIC, DIAGNOSTIC: ICD-10-PCS | Performed by: INTERNAL MEDICINE

## 2022-04-08 PROCEDURE — 36415 COLL VENOUS BLD VENIPUNCTURE: CPT | Performed by: INTERNAL MEDICINE

## 2022-04-08 PROCEDURE — 0DBM8ZX EXCISION OF DESCENDING COLON, VIA NATURAL OR ARTIFICIAL OPENING ENDOSCOPIC, DIAGNOSTIC: ICD-10-PCS | Performed by: INTERNAL MEDICINE

## 2022-04-08 RX ORDER — SODIUM CHLORIDE, SODIUM LACTATE, POTASSIUM CHLORIDE, CALCIUM CHLORIDE 600; 310; 30; 20 MG/100ML; MG/100ML; MG/100ML; MG/100ML
INJECTION, SOLUTION INTRAVENOUS CONTINUOUS
Status: DISCONTINUED | OUTPATIENT
Start: 2022-04-08 | End: 2022-04-08

## 2022-04-08 RX ORDER — LIDOCAINE HYDROCHLORIDE 10 MG/ML
INJECTION, SOLUTION EPIDURAL; INFILTRATION; INTRACAUDAL; PERINEURAL AS NEEDED
Status: DISCONTINUED | OUTPATIENT
Start: 2022-04-08 | End: 2022-04-08 | Stop reason: SURG

## 2022-04-08 RX ADMIN — LIDOCAINE HYDROCHLORIDE 50 MG: 10 INJECTION, SOLUTION EPIDURAL; INFILTRATION; INTRACAUDAL; PERINEURAL at 12:45:00

## 2022-04-08 RX ADMIN — SODIUM CHLORIDE, SODIUM LACTATE, POTASSIUM CHLORIDE, CALCIUM CHLORIDE: 600; 310; 30; 20 INJECTION, SOLUTION INTRAVENOUS at 13:11:00

## 2022-04-08 NOTE — OPERATIVE REPORT
[facility] Endoscopy Report      Preoperative Diagnosis:  - colon cancer screening      Postoperative Diagnosis:  - colon polyps x 4  - diverticulosis  - internal hemorrhoids      Procedure:    Colonoscopy      Surgeon:  Manny Byers M.D. Anesthesia:  MAC sedation    Technique:  After informed consent, the patient was placed in the left lateral recumbent position. Digital rectal examination revealed no palpable intraluminal abnormalities. An Olympus variable stiffness 190 series HD colonoscope was inserted into the rectum and advanced under direct vision by following the lumen to the cecum. The colon was examined upon withdrawal in the left lateral position. The procedure was well tolerated without immediate complication. Findings:  The preparation of the colon was good. The terminal ileum was examined for 4 cm and visually normal.  The ileocecal valve was well preserved. The visualized colonic mucosa from the cecum to the anal verge was normal with an intact vascular pattern. Colon polyps x4 removed as follows;  -Rectum x1, sessile 3 mm in size and cold snare removed. Slight oozing was noted so a single clip was placed across the mucosal defect with excellent hemostasis. -Ascending x1, sessile 1 cm in size and cold snare removed. One clip was placed across the mucosal defect.  -Transverse x1, sessile 3 to 4 mm in size and cold snare removed. -Descending x1, diminutive removed by cold forceps technique. All polypectomy sites inspected and found to be free of bleeding and specimens retrieved and sent for analysis. Diverticulosis located in the sigmoid colon, notes of diverticulitis. Small internal hemorrhoids noted on retroflexed view.     Estimated blood loss-insignificant  Specimens-colon polyps      Impression:  - colon polyps x 4  - diverticulosis  - internal hemorrhoids      Recommendations:  - Post polypectomy instructions given  - Repeat colonoscopy in 3 years  - High fiber diet for diverticular disease  - Symptomatic treatment of hemorrhoids          Markel Crain.  Fe Gaines MD  4/8/2022  1:14 PM

## 2022-04-08 NOTE — OR NURSING
Per [facility], patient with ICD, okay to use magnet. Placing magnet over ICD will suspend therapy, once magnet is removed normal settings will resume. If cautery is used within 6 inches of ICD, it is recommended patient have ICD follow up for check up.

## 2022-04-13 ENCOUNTER — TELEPHONE (OUTPATIENT)
Dept: GASTROENTEROLOGY | Facility: CLINIC | Age: 72
End: 2022-04-13

## 2022-04-13 NOTE — TELEPHONE ENCOUNTER
Health Maintenance Updated. 3 year colonoscopy recall entered into patient outreach in [address] [place] [address] Rd. Next colonoscopy will be due 4/8/2025.     Left message to call back using [employer]  Annmarie [phone]

## 2022-04-13 NOTE — TELEPHONE ENCOUNTER
----- Message from Zelda Zayas MD sent at 4/13/2022 12:46 PM CDT -----  Katie Rosado,     I wanted to get back to you with your colonoscopy results. You had 4 colon polyps removed which were benign. I would advise a repeat colonoscopy in 3 years to make sure no new polyps are forming. You also have internal hemorrhoids and diverticulosis. Please stay on a high fiber diet and call with any questions.      Dr Bertram Mendoza

## 2022-04-15 NOTE — TELEPHONE ENCOUNTER
Patient contacted using [employer]  [facility] ID [id number]. Below complete result note reviewed over the phone.

## 2022-10-10 RX ORDER — MONTELUKAST SODIUM 10 MG/1
TABLET ORAL
Qty: 90 TABLET | Refills: 3 | Status: SHIPPED | OUTPATIENT
Start: 2022-10-10

## 2022-11-29 ENCOUNTER — HOSPITAL ENCOUNTER (OUTPATIENT)
Dept: GENERAL RADIOLOGY | Age: 72
Discharge: HOME | End: 2022-11-29
Attending: INTERNAL MEDICINE
Payer: MEDICARE

## 2022-11-29 ENCOUNTER — OFFICE VISIT (OUTPATIENT)
Dept: INTERNAL MEDICINE CLINIC | Facility: CLINIC | Age: 72
End: 2022-11-29
Payer: MEDICARE

## 2022-11-29 VITALS
OXYGEN SATURATION: 95 % | WEIGHT: 159 LBS | DIASTOLIC BLOOD PRESSURE: 70 MMHG | BODY MASS INDEX: 22.76 KG/M2 | SYSTOLIC BLOOD PRESSURE: 120 MMHG | HEART RATE: 61 BPM | HEIGHT: 70 IN

## 2022-11-29 DIAGNOSIS — M54.6 MIDLINE THORACIC BACK PAIN, UNSPECIFIED CHRONICITY: ICD-10-CM

## 2022-11-29 DIAGNOSIS — Z12.5 PROSTATE CANCER SCREENING: ICD-10-CM

## 2022-11-29 DIAGNOSIS — M25.561 ACUTE PAIN OF RIGHT KNEE: ICD-10-CM

## 2022-11-29 DIAGNOSIS — M54.6 MIDLINE THORACIC BACK PAIN, UNSPECIFIED CHRONICITY: Primary | ICD-10-CM

## 2022-11-29 DIAGNOSIS — E78.49 OTHER HYPERLIPIDEMIA: ICD-10-CM

## 2022-11-29 PROCEDURE — 3074F SYST BP LT 130 MM HG: CPT | Performed by: INTERNAL MEDICINE

## 2022-11-29 PROCEDURE — 3008F BODY MASS INDEX DOCD: CPT | Performed by: INTERNAL MEDICINE

## 2022-11-29 PROCEDURE — 73562 X-RAY EXAM OF KNEE 3: CPT | Performed by: INTERNAL MEDICINE

## 2022-11-29 PROCEDURE — 99214 OFFICE O/P EST MOD 30 MIN: CPT | Performed by: INTERNAL MEDICINE

## 2022-11-29 PROCEDURE — 3078F DIAST BP <80 MM HG: CPT | Performed by: INTERNAL MEDICINE

## 2022-11-29 PROCEDURE — 71046 X-RAY EXAM CHEST 2 VIEWS: CPT | Performed by: INTERNAL MEDICINE

## 2022-11-29 RX ORDER — CYCLOBENZAPRINE HCL 10 MG
10 TABLET ORAL NIGHTLY
Qty: 30 TABLET | Refills: 1 | Status: SHIPPED | OUTPATIENT
Start: 2022-11-29 | End: 2022-11-29

## 2022-11-29 RX ORDER — CYCLOBENZAPRINE HCL 10 MG
10 TABLET ORAL NIGHTLY
Qty: 30 TABLET | Refills: 1 | Status: SHIPPED | OUTPATIENT
Start: 2022-11-29 | End: 2022-12-29

## 2022-11-30 ENCOUNTER — LAB ENCOUNTER (OUTPATIENT)
Dept: LAB | Age: 72
End: 2022-11-30
Attending: INTERNAL MEDICINE
Payer: MEDICARE

## 2022-11-30 LAB
ALBUMIN SERPL-MCNC: 3.7 G/DL (ref 3.4–5)
ALBUMIN/GLOB SERPL: 1 {RATIO} (ref 1–2)
ALP LIVER SERPL-CCNC: 84 U/L
ALT SERPL-CCNC: 21 U/L
ANION GAP SERPL CALC-SCNC: 4 MMOL/L (ref 0–18)
AST SERPL-CCNC: 12 U/L (ref 15–37)
BASOPHILS # BLD AUTO: 0.07 X10(3) UL (ref 0–0.2)
BASOPHILS NFR BLD AUTO: 0.9 %
BILIRUB SERPL-MCNC: 1.2 MG/DL (ref 0.1–2)
BUN BLD-MCNC: 27 MG/DL (ref 7–18)
BUN/CREAT SERPL: 24.1 (ref 10–20)
CALCIUM BLD-MCNC: 9.9 MG/DL (ref 8.5–10.1)
CHLORIDE SERPL-SCNC: 106 MMOL/L (ref 98–112)
CHOLEST SERPL-MCNC: 171 MG/DL (ref ?–200)
CO2 SERPL-SCNC: 32 MMOL/L (ref 21–32)
CREAT BLD-MCNC: 1.12 MG/DL
DEPRECATED RDW RBC AUTO: 56.2 FL (ref 35.1–46.3)
EOSINOPHIL # BLD AUTO: 0.47 X10(3) UL (ref 0–0.7)
EOSINOPHIL NFR BLD AUTO: 5.7 %
ERYTHROCYTE [DISTWIDTH] IN BLOOD BY AUTOMATED COUNT: 15.2 % (ref 11–15)
FASTING PATIENT LIPID ANSWER: YES
FASTING STATUS PATIENT QL REPORTED: YES
GFR SERPLBLD BASED ON 1.73 SQ M-ARVRAT: 69 ML/MIN/1.73M2 (ref 60–?)
GLOBULIN PLAS-MCNC: 3.6 G/DL (ref 2.8–4.4)
GLUCOSE BLD-MCNC: 88 MG/DL (ref 70–99)
HCT VFR BLD AUTO: 53.1 %
HDLC SERPL-MCNC: 36 MG/DL (ref 40–59)
HGB BLD-MCNC: 16.6 G/DL
IMM GRANULOCYTES # BLD AUTO: 0.08 X10(3) UL (ref 0–1)
IMM GRANULOCYTES NFR BLD: 1 %
LDLC SERPL CALC-MCNC: 120 MG/DL (ref ?–100)
LYMPHOCYTES # BLD AUTO: 1.66 X10(3) UL (ref 1–4)
LYMPHOCYTES NFR BLD AUTO: 20.2 %
MCH RBC QN AUTO: 31.3 PG (ref 26–34)
MCHC RBC AUTO-ENTMCNC: 31.3 G/DL (ref 31–37)
MCV RBC AUTO: 100.2 FL
MONOCYTES # BLD AUTO: 0.64 X10(3) UL (ref 0.1–1)
MONOCYTES NFR BLD AUTO: 7.8 %
NEUTROPHILS # BLD AUTO: 5.31 X10 (3) UL (ref 1.5–7.7)
NEUTROPHILS # BLD AUTO: 5.31 X10(3) UL (ref 1.5–7.7)
NEUTROPHILS NFR BLD AUTO: 64.4 %
NONHDLC SERPL-MCNC: 135 MG/DL (ref ?–130)
OSMOLALITY SERPL CALC.SUM OF ELEC: 299 MOSM/KG (ref 275–295)
PLATELET # BLD AUTO: 213 10(3)UL (ref 150–450)
POTASSIUM SERPL-SCNC: 5.1 MMOL/L (ref 3.5–5.1)
PROT SERPL-MCNC: 7.3 G/DL (ref 6.4–8.2)
PSA SERPL-MCNC: 2.88 NG/ML (ref ?–4)
RBC # BLD AUTO: 5.3 X10(6)UL
SODIUM SERPL-SCNC: 142 MMOL/L (ref 136–145)
TRIGL SERPL-MCNC: 78 MG/DL (ref 30–149)
VLDLC SERPL CALC-MCNC: 14 MG/DL (ref 0–30)
WBC # BLD AUTO: 8.2 X10(3) UL (ref 4–11)

## 2022-11-30 PROCEDURE — 36415 COLL VENOUS BLD VENIPUNCTURE: CPT | Performed by: INTERNAL MEDICINE

## 2022-11-30 PROCEDURE — 80061 LIPID PANEL: CPT | Performed by: INTERNAL MEDICINE

## 2022-11-30 PROCEDURE — 85025 COMPLETE CBC W/AUTO DIFF WBC: CPT | Performed by: INTERNAL MEDICINE

## 2022-11-30 PROCEDURE — 80053 COMPREHEN METABOLIC PANEL: CPT | Performed by: INTERNAL MEDICINE

## 2022-11-30 PROCEDURE — 84153 ASSAY OF PSA TOTAL: CPT | Performed by: INTERNAL MEDICINE

## 2023-03-21 ENCOUNTER — OFFICE VISIT (OUTPATIENT)
Dept: INTERNAL MEDICINE CLINIC | Facility: CLINIC | Age: 73
End: 2023-03-21
Payer: MEDICARE

## 2023-03-21 VITALS
WEIGHT: 158 LBS | HEIGHT: 70 IN | BODY MASS INDEX: 22.62 KG/M2 | DIASTOLIC BLOOD PRESSURE: 70 MMHG | SYSTOLIC BLOOD PRESSURE: 120 MMHG

## 2023-03-21 DIAGNOSIS — I48.92 ATRIAL FLUTTER, UNSPECIFIED TYPE (HCC): ICD-10-CM

## 2023-03-21 DIAGNOSIS — M54.2 NECK PAIN ON LEFT SIDE: ICD-10-CM

## 2023-03-21 DIAGNOSIS — Z00.00 MEDICARE ANNUAL WELLNESS VISIT, SUBSEQUENT: Primary | ICD-10-CM

## 2023-03-21 DIAGNOSIS — I10 ESSENTIAL HYPERTENSION: ICD-10-CM

## 2023-03-21 DIAGNOSIS — I42.0 DILATED CARDIOMYOPATHY (HCC): ICD-10-CM

## 2023-03-22 ENCOUNTER — LAB ENCOUNTER (OUTPATIENT)
Dept: LAB | Age: 73
End: 2023-03-22
Attending: INTERNAL MEDICINE
Payer: MEDICARE

## 2023-03-22 ENCOUNTER — HOSPITAL ENCOUNTER (OUTPATIENT)
Dept: GENERAL RADIOLOGY | Age: 73
Discharge: HOME | End: 2023-03-22
Attending: INTERNAL MEDICINE
Payer: MEDICARE

## 2023-03-22 DIAGNOSIS — M54.2 NECK PAIN ON LEFT SIDE: ICD-10-CM

## 2023-03-22 LAB
ALBUMIN SERPL-MCNC: 3.6 G/DL (ref 3.4–5)
ALBUMIN/GLOB SERPL: 0.9 {RATIO} (ref 1–2)
ALP LIVER SERPL-CCNC: 83 U/L
ALT SERPL-CCNC: 16 U/L
ANION GAP SERPL CALC-SCNC: 5 MMOL/L (ref 0–18)
AST SERPL-CCNC: 20 U/L (ref 15–37)
BASOPHILS # BLD AUTO: 0.05 X10(3) UL (ref 0–0.2)
BASOPHILS NFR BLD AUTO: 0.7 %
BILIRUB SERPL-MCNC: 1.1 MG/DL (ref 0.1–2)
BUN BLD-MCNC: 23 MG/DL (ref 7–18)
BUN/CREAT SERPL: 20.5 (ref 10–20)
CALCIUM BLD-MCNC: 9.7 MG/DL (ref 8.5–10.1)
CHLORIDE SERPL-SCNC: 107 MMOL/L (ref 98–112)
CHOLEST SERPL-MCNC: 145 MG/DL (ref ?–200)
CO2 SERPL-SCNC: 29 MMOL/L (ref 21–32)
CREAT BLD-MCNC: 1.12 MG/DL
DEPRECATED RDW RBC AUTO: 54.6 FL (ref 35.1–46.3)
EOSINOPHIL # BLD AUTO: 0.47 X10(3) UL (ref 0–0.7)
EOSINOPHIL NFR BLD AUTO: 6.1 %
ERYTHROCYTE [DISTWIDTH] IN BLOOD BY AUTOMATED COUNT: 15.3 % (ref 11–15)
FASTING PATIENT LIPID ANSWER: YES
FASTING STATUS PATIENT QL REPORTED: YES
GFR SERPLBLD BASED ON 1.73 SQ M-ARVRAT: 69 ML/MIN/1.73M2 (ref 60–?)
GLOBULIN PLAS-MCNC: 3.9 G/DL (ref 2.8–4.4)
GLUCOSE BLD-MCNC: 100 MG/DL (ref 70–99)
HCT VFR BLD AUTO: 51.8 %
HDLC SERPL-MCNC: 36 MG/DL (ref 40–59)
HGB BLD-MCNC: 16.3 G/DL
IMM GRANULOCYTES # BLD AUTO: 0.08 X10(3) UL (ref 0–1)
IMM GRANULOCYTES NFR BLD: 1 %
LDLC SERPL CALC-MCNC: 96 MG/DL (ref ?–100)
LYMPHOCYTES # BLD AUTO: 1.34 X10(3) UL (ref 1–4)
LYMPHOCYTES NFR BLD AUTO: 17.5 %
MCH RBC QN AUTO: 30.7 PG (ref 26–34)
MCHC RBC AUTO-ENTMCNC: 31.5 G/DL (ref 31–37)
MCV RBC AUTO: 97.6 FL
MONOCYTES # BLD AUTO: 0.48 X10(3) UL (ref 0.1–1)
MONOCYTES NFR BLD AUTO: 6.3 %
NEUTROPHILS # BLD AUTO: 5.25 X10 (3) UL (ref 1.5–7.7)
NEUTROPHILS # BLD AUTO: 5.25 X10(3) UL (ref 1.5–7.7)
NEUTROPHILS NFR BLD AUTO: 68.4 %
NONHDLC SERPL-MCNC: 109 MG/DL (ref ?–130)
OSMOLALITY SERPL CALC.SUM OF ELEC: 296 MOSM/KG (ref 275–295)
PLATELET # BLD AUTO: 228 10(3)UL (ref 150–450)
POTASSIUM SERPL-SCNC: 5.3 MMOL/L (ref 3.5–5.1)
PROT SERPL-MCNC: 7.5 G/DL (ref 6.4–8.2)
RBC # BLD AUTO: 5.31 X10(6)UL
SODIUM SERPL-SCNC: 141 MMOL/L (ref 136–145)
TRIGL SERPL-MCNC: 65 MG/DL (ref 30–149)
TSI SER-ACNC: 0.93 MIU/ML (ref 0.36–3.74)
VLDLC SERPL CALC-MCNC: 11 MG/DL (ref 0–30)
WBC # BLD AUTO: 7.7 X10(3) UL (ref 4–11)

## 2023-03-22 PROCEDURE — 80061 LIPID PANEL: CPT | Performed by: INTERNAL MEDICINE

## 2023-03-22 PROCEDURE — 72050 X-RAY EXAM NECK SPINE 4/5VWS: CPT | Performed by: INTERNAL MEDICINE

## 2023-03-22 PROCEDURE — 36415 COLL VENOUS BLD VENIPUNCTURE: CPT | Performed by: INTERNAL MEDICINE

## 2023-03-22 PROCEDURE — 84443 ASSAY THYROID STIM HORMONE: CPT | Performed by: INTERNAL MEDICINE

## 2023-03-22 PROCEDURE — 85025 COMPLETE CBC W/AUTO DIFF WBC: CPT | Performed by: INTERNAL MEDICINE

## 2023-03-22 PROCEDURE — 80053 COMPREHEN METABOLIC PANEL: CPT | Performed by: INTERNAL MEDICINE

## 2023-05-10 RX ORDER — LISINOPRIL 10 MG/1
TABLET ORAL
Qty: 90 TABLET | Refills: 0 | Status: SHIPPED | OUTPATIENT
Start: 2023-05-10

## 2023-08-07 RX ORDER — LISINOPRIL 10 MG/1
10 TABLET ORAL DAILY
Qty: 90 TABLET | Refills: 0 | Status: SHIPPED | OUTPATIENT
Start: 2023-08-07

## 2023-08-24 ENCOUNTER — OFFICE VISIT (OUTPATIENT)
Dept: INTERNAL MEDICINE CLINIC | Facility: CLINIC | Age: 73
End: 2023-08-24
Payer: MEDICARE

## 2023-08-24 VITALS
TEMPERATURE: 99 F | DIASTOLIC BLOOD PRESSURE: 60 MMHG | BODY MASS INDEX: 21.62 KG/M2 | HEIGHT: 70 IN | SYSTOLIC BLOOD PRESSURE: 120 MMHG | WEIGHT: 151 LBS | HEART RATE: 58 BPM | OXYGEN SATURATION: 94 %

## 2023-08-24 DIAGNOSIS — M25.50 PAIN IN JOINT, MULTIPLE SITES: Primary | ICD-10-CM

## 2023-08-24 DIAGNOSIS — M47.812 CERVICAL SPONDYLOSIS: ICD-10-CM

## 2023-08-24 PROCEDURE — 3078F DIAST BP <80 MM HG: CPT | Performed by: INTERNAL MEDICINE

## 2023-08-24 PROCEDURE — 3008F BODY MASS INDEX DOCD: CPT | Performed by: INTERNAL MEDICINE

## 2023-08-24 PROCEDURE — 1159F MED LIST DOCD IN RCRD: CPT | Performed by: INTERNAL MEDICINE

## 2023-08-24 PROCEDURE — 99214 OFFICE O/P EST MOD 30 MIN: CPT | Performed by: INTERNAL MEDICINE

## 2023-08-24 PROCEDURE — 3074F SYST BP LT 130 MM HG: CPT | Performed by: INTERNAL MEDICINE

## 2023-08-25 ENCOUNTER — LAB ENCOUNTER (OUTPATIENT)
Dept: LAB | Age: 73
End: 2023-08-25
Attending: INTERNAL MEDICINE
Payer: MEDICARE

## 2023-08-25 LAB
CRP SERPL-MCNC: 1.36 MG/DL (ref ?–0.3)
ERYTHROCYTE [SEDIMENTATION RATE] IN BLOOD: 8 MM/HR
RHEUMATOID FACT SERPL-ACNC: <10 IU/ML (ref ?–15)
URATE SERPL-MCNC: 6.2 MG/DL

## 2023-08-25 PROCEDURE — 86140 C-REACTIVE PROTEIN: CPT | Performed by: INTERNAL MEDICINE

## 2023-08-25 PROCEDURE — 86431 RHEUMATOID FACTOR QUANT: CPT | Performed by: INTERNAL MEDICINE

## 2023-08-25 PROCEDURE — 84550 ASSAY OF BLOOD/URIC ACID: CPT | Performed by: INTERNAL MEDICINE

## 2023-08-25 PROCEDURE — 36415 COLL VENOUS BLD VENIPUNCTURE: CPT | Performed by: INTERNAL MEDICINE

## 2023-08-25 PROCEDURE — 85652 RBC SED RATE AUTOMATED: CPT | Performed by: INTERNAL MEDICINE

## 2023-10-31 DIAGNOSIS — H83.3X1 NOISE-INDUCED HEARING LOSS OF RIGHT EAR, UNSPECIFIED HEARING STATUS ON CONTRALATERAL SIDE: Primary | ICD-10-CM

## 2023-11-03 RX ORDER — LISINOPRIL 10 MG/1
10 TABLET ORAL DAILY
Qty: 90 TABLET | Refills: 0 | Status: SHIPPED | OUTPATIENT
Start: 2023-11-03

## 2023-11-29 ENCOUNTER — OFFICE VISIT (OUTPATIENT)
Dept: RHEUMATOLOGY | Facility: CLINIC | Age: 73
End: 2023-11-29
Payer: MEDICARE

## 2023-11-29 VITALS
DIASTOLIC BLOOD PRESSURE: 70 MMHG | WEIGHT: 152.69 LBS | SYSTOLIC BLOOD PRESSURE: 149 MMHG | BODY MASS INDEX: 21.86 KG/M2 | HEART RATE: 60 BPM | RESPIRATION RATE: 16 BRPM | HEIGHT: 70 IN

## 2023-11-29 DIAGNOSIS — R79.82 ELEVATED C-REACTIVE PROTEIN (CRP): ICD-10-CM

## 2023-11-29 DIAGNOSIS — M25.50 POLYARTHRALGIA: Primary | ICD-10-CM

## 2023-11-29 PROCEDURE — 3008F BODY MASS INDEX DOCD: CPT | Performed by: INTERNAL MEDICINE

## 2023-11-29 PROCEDURE — 1125F AMNT PAIN NOTED PAIN PRSNT: CPT | Performed by: INTERNAL MEDICINE

## 2023-11-29 PROCEDURE — 1159F MED LIST DOCD IN RCRD: CPT | Performed by: INTERNAL MEDICINE

## 2023-11-29 PROCEDURE — 99204 OFFICE O/P NEW MOD 45 MIN: CPT | Performed by: INTERNAL MEDICINE

## 2023-11-29 PROCEDURE — 1160F RVW MEDS BY RX/DR IN RCRD: CPT | Performed by: INTERNAL MEDICINE

## 2023-11-29 PROCEDURE — 3078F DIAST BP <80 MM HG: CPT | Performed by: INTERNAL MEDICINE

## 2023-11-29 PROCEDURE — 3077F SYST BP >= 140 MM HG: CPT | Performed by: INTERNAL MEDICINE

## 2023-11-29 NOTE — PATIENT INSTRUCTIONS
Check labs when hurting more   Check xrays  Return to clinic in 4-6 weeks.    Tyelnol as needed - 12/28 at 12:40 pm

## 2023-12-04 ENCOUNTER — HOSPITAL ENCOUNTER (OUTPATIENT)
Dept: GENERAL RADIOLOGY | Age: 73
Discharge: HOME | End: 2023-12-04
Attending: INTERNAL MEDICINE
Payer: MEDICARE

## 2023-12-04 ENCOUNTER — LAB ENCOUNTER (OUTPATIENT)
Dept: LAB | Age: 73
End: 2023-12-04
Attending: INTERNAL MEDICINE
Payer: MEDICARE

## 2023-12-04 DIAGNOSIS — R79.82 ELEVATED C-REACTIVE PROTEIN (CRP): ICD-10-CM

## 2023-12-04 DIAGNOSIS — M25.50 POLYARTHRALGIA: ICD-10-CM

## 2023-12-04 LAB
CRP SERPL-MCNC: 1.9 MG/DL (ref ?–1)
ERYTHROCYTE [SEDIMENTATION RATE] IN BLOOD: 14 MM/HR
URATE SERPL-MCNC: 6 MG/DL

## 2023-12-04 PROCEDURE — 36415 COLL VENOUS BLD VENIPUNCTURE: CPT | Performed by: INTERNAL MEDICINE

## 2023-12-04 PROCEDURE — 73110 X-RAY EXAM OF WRIST: CPT | Performed by: INTERNAL MEDICINE

## 2023-12-04 PROCEDURE — 84550 ASSAY OF BLOOD/URIC ACID: CPT | Performed by: INTERNAL MEDICINE

## 2023-12-04 PROCEDURE — 86038 ANTINUCLEAR ANTIBODIES: CPT

## 2023-12-04 PROCEDURE — 85652 RBC SED RATE AUTOMATED: CPT | Performed by: INTERNAL MEDICINE

## 2023-12-04 PROCEDURE — 73610 X-RAY EXAM OF ANKLE: CPT | Performed by: INTERNAL MEDICINE

## 2023-12-04 PROCEDURE — 86200 CCP ANTIBODY: CPT | Performed by: INTERNAL MEDICINE

## 2023-12-04 PROCEDURE — 86140 C-REACTIVE PROTEIN: CPT | Performed by: INTERNAL MEDICINE

## 2023-12-05 LAB — CCP IGG SERPL-ACNC: 1.8 U/ML (ref 0–6.9)

## 2023-12-07 LAB — NUCLEAR IGG TITR SER IF: NEGATIVE {TITER}

## 2023-12-28 ENCOUNTER — OFFICE VISIT (OUTPATIENT)
Dept: RHEUMATOLOGY | Facility: CLINIC | Age: 73
End: 2023-12-28
Payer: MEDICARE

## 2023-12-28 VITALS — BODY MASS INDEX: 21.88 KG/M2 | HEIGHT: 70 IN | WEIGHT: 152.81 LBS | RESPIRATION RATE: 16 BRPM

## 2023-12-28 DIAGNOSIS — M25.532 BILATERAL WRIST PAIN: Primary | ICD-10-CM

## 2023-12-28 DIAGNOSIS — M25.531 BILATERAL WRIST PAIN: Primary | ICD-10-CM

## 2023-12-28 DIAGNOSIS — M25.50 POLYARTHRALGIA: ICD-10-CM

## 2023-12-28 DIAGNOSIS — R79.82 ELEVATED C-REACTIVE PROTEIN (CRP): ICD-10-CM

## 2023-12-28 PROCEDURE — 1125F AMNT PAIN NOTED PAIN PRSNT: CPT | Performed by: INTERNAL MEDICINE

## 2023-12-28 PROCEDURE — 3008F BODY MASS INDEX DOCD: CPT | Performed by: INTERNAL MEDICINE

## 2023-12-28 PROCEDURE — 1159F MED LIST DOCD IN RCRD: CPT | Performed by: INTERNAL MEDICINE

## 2023-12-28 PROCEDURE — 1160F RVW MEDS BY RX/DR IN RCRD: CPT | Performed by: INTERNAL MEDICINE

## 2023-12-28 PROCEDURE — 99214 OFFICE O/P EST MOD 30 MIN: CPT | Performed by: INTERNAL MEDICINE

## 2023-12-28 RX ORDER — METHYLPREDNISOLONE 4 MG/1
TABLET ORAL
Qty: 1 EACH | Refills: 0 | Status: SHIPPED | OUTPATIENT
Start: 2023-12-28

## 2023-12-28 NOTE — PATIENT INSTRUCTIONS
Medrol yong after ski trip   Ultrasound right wrist and left wrist - call [phone] - to schedule   Return to clinic in 3 months.    Tyelnol as needed -

## 2024-01-22 RX ORDER — LISINOPRIL 10 MG/1
10 TABLET ORAL DAILY
Qty: 90 TABLET | Refills: 0 | Status: SHIPPED | OUTPATIENT
Start: 2024-01-22

## 2024-02-06 ENCOUNTER — OFFICE VISIT (OUTPATIENT)
Dept: INTERNAL MEDICINE CLINIC | Facility: CLINIC | Age: 74
End: 2024-02-06
Payer: MEDICARE

## 2024-02-06 VITALS
HEIGHT: 70 IN | SYSTOLIC BLOOD PRESSURE: 120 MMHG | HEART RATE: 59 BPM | BODY MASS INDEX: 21.62 KG/M2 | DIASTOLIC BLOOD PRESSURE: 60 MMHG | OXYGEN SATURATION: 94 % | WEIGHT: 151 LBS

## 2024-02-06 DIAGNOSIS — I48.0 PAROXYSMAL ATRIAL FIBRILLATION (HCC): ICD-10-CM

## 2024-02-06 DIAGNOSIS — I42.8 OTHER CARDIOMYOPATHY (HCC): ICD-10-CM

## 2024-02-06 DIAGNOSIS — M25.50 POLYARTHRALGIA: Primary | ICD-10-CM

## 2024-02-06 PROCEDURE — 3008F BODY MASS INDEX DOCD: CPT | Performed by: INTERNAL MEDICINE

## 2024-02-06 PROCEDURE — 1159F MED LIST DOCD IN RCRD: CPT | Performed by: INTERNAL MEDICINE

## 2024-02-06 PROCEDURE — 3078F DIAST BP <80 MM HG: CPT | Performed by: INTERNAL MEDICINE

## 2024-02-06 PROCEDURE — 3074F SYST BP LT 130 MM HG: CPT | Performed by: INTERNAL MEDICINE

## 2024-02-06 PROCEDURE — 99214 OFFICE O/P EST MOD 30 MIN: CPT | Performed by: INTERNAL MEDICINE

## 2024-02-06 RX ORDER — PREDNISONE 10 MG/1
10 TABLET ORAL DAILY
Qty: 30 TABLET | Refills: 1 | Status: SHIPPED | OUTPATIENT
Start: 2024-02-06

## 2024-02-06 NOTE — PROGRESS NOTES
Subjective:   Serg Nagel is a 73 year old male who presents for Follow - Up     Patient here for fu on polyarthralgia which seems to have responded to a medrol dose pack.Exact dz is still under consideration.  Next fu is in April with rheumatology.  Has morning stiffness and occasional swelling of joint spaces.  History/Other:    Chief Complaint Reviewed and Verified  No Further Nursing Notes to   Review  Medications Reviewed  Problem List Reviewed         Tobacco:  He has never smoked tobacco.    Current Outpatient Medications   Medication Sig Dispense Refill    predniSONE 10 MG Oral Tab Take 1 tablet (10 mg total) by mouth daily. 30 tablet 1    lisinopril 10 MG Oral Tab Take 1 tablet (10 mg total) by mouth daily. 90 tablet 0    methylPREDNISolone 4 MG Oral Tablet Therapy Pack Take as directed on package. 1 each 0    XARELTO 20 MG Oral Tab Take 1 tablet (20 mg total) by mouth daily with food.           Review of Systems:  Review of Systems   HENT:  Positive for sore throat.    Musculoskeletal:  Positive for arthralgias (wrtists and fingers) and joint swelling.         Objective:   /60   Pulse 59   Ht 5' 10\" (1.778 m)   Wt 151 lb (68.5 kg)   SpO2 94%   BMI 21.67 kg/m²  Estimated body mass index is 21.67 kg/m² as calculated from the following:    Height as of this encounter: 5' 10\" (1.778 m).    Weight as of this encounter: 151 lb (68.5 kg).  Physical Exam  Constitutional:       Appearance: Normal appearance.   HENT:      Head: Normocephalic and atraumatic.   Eyes:      General: No scleral icterus.     Pupils: Pupils are equal, round, and reactive to light.   Cardiovascular:      Rate and Rhythm: Normal rate and regular rhythm.      Heart sounds: No murmur heard.  Pulmonary:      Breath sounds: No rales.   Abdominal:      Tenderness: There is no abdominal tenderness.   Musculoskeletal:      Cervical back: Neck supple.      Right lower leg: No edema.      Left lower leg: No edema.   Skin:      Findings: No lesion.   Neurological:      Mental Status: He is alert. Mental status is at baseline.   Psychiatric:         Thought Content: Thought content normal.           Assessment & Plan:   1. Polyarthralgia (Primary) in conj with CRP elevation- if flares again before rheumatology appt - prednisone 10 mg every other day   2. Paroxysmal atrial fibrillation (HCC) on Xarelto  3. Other cardiomyopathy (HCC) clinically compensated  EF 52 %  Other orders  -     predniSONE; Take 1 tablet (10 mg total) by mouth daily.  Dispense: 30 tablet; Refill: 1        No follow-ups on file.    Tara Rodriguez MD, 2/6/2024, 1:20 PM

## 2024-03-15 ENCOUNTER — HOSPITAL ENCOUNTER (EMERGENCY)
Facility: HOSPITAL | Age: 74
Discharge: HOME | End: 2024-03-15
Attending: EMERGENCY MEDICINE
Payer: MEDICARE

## 2024-03-15 ENCOUNTER — APPOINTMENT (OUTPATIENT)
Dept: GENERAL RADIOLOGY | Facility: HOSPITAL | Age: 74
End: 2024-03-15
Payer: MEDICARE

## 2024-03-15 VITALS
SYSTOLIC BLOOD PRESSURE: 131 MMHG | RESPIRATION RATE: 16 BRPM | HEART RATE: 62 BPM | OXYGEN SATURATION: 99 % | DIASTOLIC BLOOD PRESSURE: 72 MMHG | TEMPERATURE: 97 F

## 2024-03-15 DIAGNOSIS — M25.561 ACUTE PAIN OF RIGHT KNEE: Primary | ICD-10-CM

## 2024-03-15 PROCEDURE — 99283 EMERGENCY DEPT VISIT LOW MDM: CPT

## 2024-03-15 PROCEDURE — 73560 X-RAY EXAM OF KNEE 1 OR 2: CPT | Performed by: EMERGENCY MEDICINE

## 2024-03-15 PROCEDURE — 99284 EMERGENCY DEPT VISIT MOD MDM: CPT

## 2024-03-15 NOTE — DISCHARGE INSTRUCTIONS
Rest, ice, and elevate your right knee.  Use a compression sleeve during the daytime.  Take low-dose ibuprofen, 200 mg 2-3 times daily for the next 10 days to relieve pain.

## 2024-03-15 NOTE — ED INITIAL ASSESSMENT (HPI)
Intermittent atraumatic pain to R medial knee for past 2 weeks worse and more constant for past 2-3 days.  Swelling to R leg. CMS in tact distally.  Denies CP/CAMPOS.

## 2024-03-16 NOTE — ED QUICK NOTES
Patient assessed and cleared for discharged by EDMD prior to RN assessment. Patient stable and agreeable with ongoing plan of care.

## 2024-03-16 NOTE — ED PROVIDER NOTES
Patient Seen in: [facility] Emergency Department    History     Chief Complaint   Patient presents with    Knee Pain       HPI    The patient presents to the ED complaining of right knee pain for the past 2 weeks.  He states no history of trauma.  Pain is isolated to the medial knee.  Still able to walk without difficulty.    History reviewed.   Past Medical History:   Diagnosis Date    Arrhythmia     Congestive heart disease (HCC)     CVA (cerebral infarction)     Essential hypertension     High blood pressure     High cholesterol     History of blood clot in brain     Other and unspecified hyperlipidemia        History reviewed.   Past Surgical History:   Procedure Laterality Date    CARDIAC DEFIBRILLATOR PLACEMENT      CARDIAC PACEMAKER PLACEMENT      COLONOSCOPY N/A 4/8/2022    Procedure: COLONOSCOPY;  Surgeon: Handy Dinero MD;  Location: [facility] ENDOSCOPY    HERNIA SURGERY Right 1/7/15    inguinal          Medications :  (Not in a hospital admission)       Family History   Problem Relation Age of Onset    Lipids Mother     Hypertension Mother     Heart Disorder Father     Diabetes Neg     Cancer Neg     Glaucoma Neg        Smoking Status:   Social History     Socioeconomic History    Marital status:    Tobacco Use    Smoking status: Never    Smokeless tobacco: Never    Tobacco comments:     never smoker   Vaping Use    Vaping Use: Never used   Substance and Sexual Activity    Alcohol use: Yes     Alcohol/week: 1.0 standard drink of alcohol     Types: 1 Cans of beer per week     Comment: 3 drinks per week    Drug use: No   Other Topics Concern    Caffeine Concern Yes     Comment: 1 CUP COFFEE       Constitutional and vital signs reviewed.      Social History and Family History elements reviewed from today, pertinent positives to the presenting problem noted.    Physical Exam     ED Triage Vitals [03/15/24 1634]   BP (!) 168/78   Pulse 77   Resp 16   Temp 97 °F (36.1 °C)   Temp src    SpO2 96 %    O2 Device None (Room air)       All measures to prevent infection transmission during my interaction with the patient were taken. Handwashing was performed prior to and after the exam.  Stethoscope and any equipment used during my examination was cleaned with super sani-cloth germicidal wipes following the exam.     Physical Exam  Constitutional:       Appearance: Normal appearance.   Cardiovascular:      Pulses: Normal pulses.   Pulmonary:      Effort: Pulmonary effort is normal. No respiratory distress.   Musculoskeletal:         General: Tenderness present.      Comments: Tenderness to the right medial knee without bony deformity, no laxity noted and near normal range of motion.   Neurological:      Mental Status: He is alert. Mental status is at baseline.   Psychiatric:         Mood and Affect: Mood normal.         Behavior: Behavior normal.         ED Course      Labs Reviewed - No data to display    As Interpreted by me    Imaging Results Available and Reviewed while in ED: XR KNEE (1 OR 2 VIEWS), RIGHT (CPT=73560)    Result Date: 3/15/2024  CONCLUSION:   No acute fracture or dislocation.  Minimal tricompartmental osteoarthrosis.  Mild subcutaneous edema about the medial aspect of the knee and a trace suprapatellar joint effusion.     Dictated by (CST): Braulio Pineda MD on 3/15/2024 at 5:22 PM     Finalized by (CST): Braulio Pineda MD on 3/15/2024 at 5:24 PM         ED Medications Administered: Medications - No data to display      MDM     Vitals:    03/15/24 1634 03/15/24 1851   BP: (!) 168/78 131/72   Pulse: 77 62   Resp: 16 16   Temp: 97 °F (36.1 °C)    SpO2: 96% 99%     *I personally reviewed and interpreted all ED vitals.    Pulse Ox: 99%, Room air, Normal     Differential Diagnosis/ Diagnostic Considerations: Knee sprain, knee pain, other    Complicating Factors: The patient already has does not have any pertinent problems on file. to contribute to the complexity of this ED evaluation.    Medical  Decision Making  The patient presents to the ED with right knee pain.  No history of injury.  Isolated medial knee tenderness on exam.  X-ray negative for fracture patient advised on RICE treatment and outpatient orthopedic follow-up.    Problems Addressed:  Acute pain of right knee: acute illness or injury    Amount and/or Complexity of Data Reviewed  Radiology: ordered and independent interpretation performed. Decision-making details documented in ED Course.     Details: I personally reviewed the patient's right knee x-ray images and noted no fracture or dislocation        Condition upon leaving the department: Stable    Disposition and Plan     Clinical Impression:  1. Acute pain of right knee        Disposition:  Discharge    Follow-up:  Tara Rodriguez MD  [address] [id number] [phone]    Schedule an appointment as soon as possible for a visit in 3 day(s)      Rivera Houser MD  [address] [address]  [phone]    Schedule an appointment as soon as possible for a visit in 1 week(s)        Medications Prescribed:  Discharge Medication List as of 3/15/2024  6:48 PM

## 2024-03-18 ENCOUNTER — PATIENT OUTREACH (OUTPATIENT)
Dept: CASE MANAGEMENT | Age: 74
End: 2024-03-18

## 2024-03-18 ENCOUNTER — TELEPHONE (OUTPATIENT)
Dept: ORTHOPEDICS CLINIC | Facility: CLINIC | Age: 74
End: 2024-03-18

## 2024-03-18 NOTE — TELEPHONE ENCOUNTER
S/w patient- states that 3 weeks ago, he may have twisted his knee. States that pain has gotten worse over the last 3 weeks. Went to ED on 3/15/24 because it was becoming difficult to walk. Xray negative for fracture, but told to follow up with ortho. I offered 8:30 on 3/22/24 with Dr Martines. He accepted and had no further questions at this time

## 2024-03-18 NOTE — TELEPHONE ENCOUNTER
Pt went to the emergency room on Friday 3-15-24 for right knee.  Advised to be seen in one week.  No appointments available.  Please call pt

## 2024-03-18 NOTE — PROGRESS NOTES
1st attempt ER f/up apt request  ORTHO -decline, pt has already been in contact w/ office and waiting on call back to schedule     Tara Rodriguez  PCP  [address] [facility] [address]  [phone]  Apt: March 29 @8am     Confirmed w/ pt  Closing encounter

## 2024-03-22 ENCOUNTER — OFFICE VISIT (OUTPATIENT)
Dept: ORTHOPEDICS CLINIC | Facility: CLINIC | Age: 74
End: 2024-03-22

## 2024-03-22 VITALS — BODY MASS INDEX: 20.62 KG/M2 | HEIGHT: 70 IN | WEIGHT: 144 LBS

## 2024-03-22 DIAGNOSIS — S83.241A TEAR OF MEDIAL MENISCUS OF RIGHT KNEE, CURRENT, INITIAL ENCOUNTER: Primary | ICD-10-CM

## 2024-03-22 PROCEDURE — 1159F MED LIST DOCD IN RCRD: CPT | Performed by: STUDENT IN AN ORGANIZED HEALTH CARE EDUCATION/TRAINING PROGRAM

## 2024-03-22 PROCEDURE — 1160F RVW MEDS BY RX/DR IN RCRD: CPT | Performed by: STUDENT IN AN ORGANIZED HEALTH CARE EDUCATION/TRAINING PROGRAM

## 2024-03-22 PROCEDURE — 3008F BODY MASS INDEX DOCD: CPT | Performed by: STUDENT IN AN ORGANIZED HEALTH CARE EDUCATION/TRAINING PROGRAM

## 2024-03-22 PROCEDURE — 99204 OFFICE O/P NEW MOD 45 MIN: CPT | Performed by: STUDENT IN AN ORGANIZED HEALTH CARE EDUCATION/TRAINING PROGRAM

## 2024-03-22 NOTE — PROGRESS NOTES
Orthopaedic Surgery New Patient Visit  _____________________________________________________________________________________________________  _____________________________________________________________________________________________________    DATE OF VISIT: 3/22/2024     CHIEF COMPLAINT:   Chief Complaint   Patient presents with    Knee Pain     Consult -R knee -Polish speaking pt, language line in room. Pt went to ER a week ago due to knee pain. Pain onset 1 month ago after turning leg a certain way after getting off couch. Pain with movement - No swelling - Pain on medial aspect of knee.   Had xray in ER.        HISTORY OF PRESENT ILLNESS: Serg Nagel is a 73 year old male who presents to the clinic for pain in his right knee.  He reports that approximately 1 month ago he turned his knee while trying to get off the couch.  He had immediate pain and swelling about the medial aspect of his knee.  This continued to worsen over the following 2 weeks.  Then, in the past week or so, he has had progressive decrease in the pain in the inside aspect of his knee.  He denies any mechanical symptoms.  He denies any collapse of the knee or inability to trust the knee or instability symptoms.  He denies neurologic symptoms.  He denies any other injuries or antecedent pain.  He was previously skiing in [place] approximately 2 months ago.  He is otherwise active and healthy.  Pain is made worse with twisting of the knee and improved with walking gently on it.  He is not taking any medications for the knee at this point.    SOCIAL HISTORY  Social History     Socioeconomic History    Marital status:      Spouse name: Not on file    Number of children: Not on file    Years of education: Not on file    Highest education level: Not on file   Occupational History    Not on file   Tobacco Use    Smoking status: Never    Smokeless tobacco: Never    Tobacco comments:     never smoker   Vaping Use    Vaping Use: Never  used   Substance and Sexual Activity    Alcohol use: Yes     Alcohol/week: 1.0 standard drink of alcohol     Types: 1 Cans of beer per week     Comment: 3 drinks per week    Drug use: No    Sexual activity: Not on file   Other Topics Concern     Service Not Asked    Blood Transfusions Not Asked    Caffeine Concern Yes     Comment: 1 CUP COFFEE    Occupational Exposure Not Asked    Hobby Hazards Not Asked    Sleep Concern Not Asked    Stress Concern Not Asked    Weight Concern Not Asked    Special Diet Not Asked    Back Care Not Asked    Exercise Not Asked    Bike Helmet Not Asked    Seat Belt Not Asked    Self-Exams Not Asked   Social History Narrative    Not on file     Social Determinants of Health     Financial Resource Strain: Not on file   Food Insecurity: Not on file   Transportation Needs: Not on file   Physical Activity: Not on file   Stress: Not on file   Social Connections: Not on file   Housing Stability: Not on file        PAST MEDICAL HISTORY  Past Medical History:   Diagnosis Date    Arrhythmia     Congestive heart disease (HCC)     CVA (cerebral infarction)     Essential hypertension     High blood pressure     High cholesterol     History of blood clot in brain     Other and unspecified hyperlipidemia         PAST SURGICAL HISTORY  Past Surgical History:   Procedure Laterality Date    CARDIAC DEFIBRILLATOR PLACEMENT      CARDIAC PACEMAKER PLACEMENT      COLONOSCOPY N/A 4/8/2022    Procedure: COLONOSCOPY;  Surgeon: Handy Dinero MD;  Location: [facility] ENDOSCOPY    HERNIA SURGERY Right 1/7/15    inguinal         MEDICATIONS  * Reviewed  No outpatient medications have been marked as taking for the 3/22/24 encounter (Appointment) with Abhay Martines MD.        ALLERGIES  Allergies   Allergen Reactions    Seasonal         FAMILY HISTORY  Denies family hx anesthesia rxns  Family History   Problem Relation Age of Onset    Lipids Mother     Hypertension Mother     Heart Disorder Father     Diabetes  Neg     Cancer Neg     Glaucoma Neg         REVIEW OF SYSTEMS  A 14 point review of systems was performed. Pertinent positives and negatives noted in the HPI.    PHYSICAL EXAM  There were no vitals taken for this visit.     Constitutional: The patient is well-developed, well-nourished, in no acute distress.  Neurological: Alert and oriented to person, place, and time.  Psychiatric: Mood and affect normal.  Head: Normocephalic and atraumatic.  Cardiovascular: regular rate by palpation  Pulmonary/Chest: Effort normal. No respiratory distress. Breathing non-labored  Abdominal: Abdomen exhibits no distension.   * Right  KNEE  -   INSPECTION/PALPATION         - No readily apparent visual abnormalities of the knee.          - No ecchymosis, erythema, or effusion.          - No breaks in skin. Skin is euthermic.         - Neutral alignment on standing, slightly antalgic gait         - TTP to medial joint line, NTTP to lateral joint line  -   ROM         - 0-135 degrees  -   KNEE STRENGTH         - Flexion: 5/5         - Extension: 5/5  -   STABILITY         - 1A lachman, stable anterior drawer, negative pivot shift         - Stable posterior drawer, negative posterior sag and quad active test         - Stable to varus and valgus stress at 0 and 30 degrees         - 1 quadrant of lateral patellar translation         - Negative J sign, negative apprehension  -   POSITIVE         - Saniya, Thessaly  -   NEGATIVE         - Patellar grind  -   SILT Sugey/Saph/SPN/DPN/T  -   2+ DP/PT pulse     RESULTS    Lab Results   Component Value Date    WBC 7.7 03/22/2023    HGB 16.3 03/22/2023    .0 03/22/2023      Lab Results   Component Value Date     (H) 03/22/2023    BUN 23 (H) 03/22/2023    CREATSERUM 1.12 03/22/2023    GFR >60 08/22/2015    GFRNAA 69 01/15/2022    GFRAA 80 01/15/2022        IMAGING  I independently viewed and interpreted the imaging. Radiologist interpretation is available in the imaging report.  X-ray:  Plain films of the right knee including AP, lateral, and sunrise views were obtained and reviewed. These demonstrate no acute osseous abnormalities.  The patella is centrally positioned within the femoral trochlea.  There are minimal to no degenerative changes in the patellofemoral joint.  There are mild degenerative changes in the medial tibiofemoral joint.  There were no loose bodies evident.  There is no evidence of Segond fracture or other fractures.     XR KNEE (1 OR 2 VIEWS), RIGHT (CPT=73560)    Result Date: 3/15/2024  PROCEDURE: XR KNEE (1 OR 2 VIEWS), RIGHT (CPT=73560)  COMPARISON: [facility], XR KNEE (3 VIEWS), RIGHT (CPT=73562), 11/29/2022, 12:39 PM.  INDICATIONS: Right medial knee pain for 3 days, no known injury  TECHNIQUE: 2 views were obtained.   FINDINGS:  BONES: No acute fracture.  No dislocation.  Minimal tricompartmental osteoarthrosis demonstrated by marginal osteophytes. SOFT TISSUES: There are vascular calcifications.  Mild subcutaneous edema about the medial aspect of the knee. EFFUSION: Trace suprapatellar joint effusion. OTHER: Negative.         CONCLUSION:   No acute fracture or dislocation.  Minimal tricompartmental osteoarthrosis.  Mild subcutaneous edema about the medial aspect of the knee and a trace suprapatellar joint effusion.     Dictated by (CST): Braulio Pineda MD on 3/15/2024 at 5:22 PM     Finalized by (CST): Braulio Pineda MD on 3/15/2024 at 5:24 PM             ASSESSMENT/PLAN: Serg Nagel is a 73 year old male who presents to the Orthopaedic surgery clinic today for right knee pain which is consistent with medial degenerative changes which likely contributed to development of a medial meniscus tear.  This does not have any mechanical symptoms.  We discussed treatment options including therapy, anti-inflammatories, and knee sleeve wear.  We also discussed activities and exercises to help improve his current symptoms.  He would not like to do any  therapy or anti-inflammatories at this time.  We discussed return precautions including if he develops mechanical symptoms.  He understands his current diagnosis and will follow-up as needed    Discussed the history, physical exam, treatment to date, and reviewed relevant imaging an studies with the patient.  WEIGHT BEARING STATUS: Full weight bearing  RANGE-OF-MOTION LIMITATIONS: full  NEW PRESCRIPTIONS: none  IMAGING ORDERED: none  CONSULTS PLACED: none  PROSTHESES/ORTHOTICS: none  PROCEDURES: none    FOLLOW-UP: as needed    RADIOGRAPHS AT NEXT VISIT: none    I have personally seen Serg Nagel and discussed in detail their plan of care. Prior to departure, they indicated agreement with and understanding of their plan of care and their follow-up as documented herein this note.    Abhay Martines MD  Orthopaedic Surgery  3/22/2024

## 2024-03-22 NOTE — PATIENT INSTRUCTIONS
Dear Serg Nagel ,    It was a pleasure seeing you today. I saw you for your right knee. Today we discussed meniscal injuries and arthritis.  We also discussed continued activity and moving the leg for best outcomes.  We also discussed return precautions including inability to move your leg.      Please don't hesitate to reach out if you have any questions or concerns.    Sincerely,  Dr. Abhay Martines

## 2024-03-29 ENCOUNTER — OFFICE VISIT (OUTPATIENT)
Dept: INTERNAL MEDICINE CLINIC | Facility: CLINIC | Age: 74
End: 2024-03-29
Payer: MEDICARE

## 2024-03-29 VITALS
BODY MASS INDEX: 21.33 KG/M2 | SYSTOLIC BLOOD PRESSURE: 120 MMHG | DIASTOLIC BLOOD PRESSURE: 60 MMHG | OXYGEN SATURATION: 97 % | WEIGHT: 149 LBS | HEART RATE: 69 BPM | HEIGHT: 70 IN

## 2024-03-29 DIAGNOSIS — M25.551 CHRONIC ARTHRALGIAS OF KNEES AND HIPS: ICD-10-CM

## 2024-03-29 DIAGNOSIS — M25.561 CHRONIC ARTHRALGIAS OF KNEES AND HIPS: ICD-10-CM

## 2024-03-29 DIAGNOSIS — M25.562 CHRONIC PAIN OF LEFT KNEE: Primary | ICD-10-CM

## 2024-03-29 DIAGNOSIS — M25.562 CHRONIC ARTHRALGIAS OF KNEES AND HIPS: ICD-10-CM

## 2024-03-29 DIAGNOSIS — G89.29 CHRONIC PAIN OF LEFT KNEE: Primary | ICD-10-CM

## 2024-03-29 DIAGNOSIS — Z12.5 PROSTATE CANCER SCREENING: ICD-10-CM

## 2024-03-29 DIAGNOSIS — M25.552 CHRONIC ARTHRALGIAS OF KNEES AND HIPS: ICD-10-CM

## 2024-03-29 DIAGNOSIS — G89.29 CHRONIC ARTHRALGIAS OF KNEES AND HIPS: ICD-10-CM

## 2024-03-29 PROCEDURE — 1159F MED LIST DOCD IN RCRD: CPT | Performed by: INTERNAL MEDICINE

## 2024-03-29 PROCEDURE — 3078F DIAST BP <80 MM HG: CPT | Performed by: INTERNAL MEDICINE

## 2024-03-29 PROCEDURE — 3074F SYST BP LT 130 MM HG: CPT | Performed by: INTERNAL MEDICINE

## 2024-03-29 PROCEDURE — 1160F RVW MEDS BY RX/DR IN RCRD: CPT | Performed by: INTERNAL MEDICINE

## 2024-03-29 PROCEDURE — 3008F BODY MASS INDEX DOCD: CPT | Performed by: INTERNAL MEDICINE

## 2024-03-29 PROCEDURE — 99214 OFFICE O/P EST MOD 30 MIN: CPT | Performed by: INTERNAL MEDICINE

## 2024-03-29 NOTE — PROGRESS NOTES
Subjective:   Serg Nagel is a 73 year old male who presents for ER F/U     Patient here for fu from ED where he presented with some chronic pains in the right medial knee.  Saw orthopedics and no additional imaging ordered.   Recommended giving some time and observation.    Has some scattered joint aches - requwesting a 2 opinion rhuematology    History/Other:    Chief Complaint Reviewed and Verified  No Further Nursing Notes to   Review  Medications Reviewed         Tobacco:  He has never smoked tobacco.    Current Outpatient Medications   Medication Sig Dispense Refill    lisinopril 10 MG Oral Tab Take 1 tablet (10 mg total) by mouth daily. 90 tablet 0    XARELTO 20 MG Oral Tab Take 1 tablet (20 mg total) by mouth daily with food.           Review of Systems:  Review of Systems   Musculoskeletal:  Positive for arthralgias (scattered). Negative for gait problem and joint swelling.         Objective:   /60   Pulse 69   Ht 5' 10\" (1.778 m)   Wt 149 lb (67.6 kg)   SpO2 97%   BMI 21.38 kg/m²  Estimated body mass index is 21.38 kg/m² as calculated from the following:    Height as of this encounter: 5' 10\" (1.778 m).    Weight as of this encounter: 149 lb (67.6 kg).  Physical Exam  Constitutional:       Appearance: He is normal weight.   Eyes:      General: No scleral icterus.     Pupils: Pupils are equal, round, and reactive to light.   Cardiovascular:      Rate and Rhythm: Normal rate and regular rhythm.   Pulmonary:      Effort: Pulmonary effort is normal.      Breath sounds: Normal breath sounds.   Musculoskeletal:         General: Tenderness (right medial knee tenderness) present. No swelling or deformity.   Skin:     Findings: No lesion.   Neurological:      Mental Status: He is alert.   Psychiatric:         Thought Content: Thought content normal.           Assessment & Plan:   1. Chronic pain of left knee (Primary) - refer for PT   -     Physical Therapy Referral - [facility]    2.  Scattered arthralgias - second opinion rheumatology    No follow-ups on file.    Tara Rodriguez MD, 3/29/2024, 8:13 AM

## 2024-03-30 LAB
PSA, TOTAL: 2.07 NG/ML
VITAMIN B12: 678 PG/ML (ref 200–1100)
VITAMIN D, 25-OH, TOTAL: 40 NG/ML (ref 30–100)

## 2024-04-17 RX ORDER — LISINOPRIL 10 MG/1
10 TABLET ORAL DAILY
Qty: 90 TABLET | Refills: 0 | Status: SHIPPED | OUTPATIENT
Start: 2024-04-17

## 2024-05-16 ENCOUNTER — OFFICE VISIT (OUTPATIENT)
Dept: INTERNAL MEDICINE CLINIC | Facility: CLINIC | Age: 74
End: 2024-05-16

## 2024-05-16 VITALS
HEART RATE: 61 BPM | HEIGHT: 70 IN | OXYGEN SATURATION: 99 % | TEMPERATURE: 97 F | DIASTOLIC BLOOD PRESSURE: 62 MMHG | WEIGHT: 147.81 LBS | BODY MASS INDEX: 21.16 KG/M2 | SYSTOLIC BLOOD PRESSURE: 130 MMHG

## 2024-05-16 DIAGNOSIS — G89.29 CHRONIC KNEE PAIN AFTER TOTAL REPLACEMENT OF RIGHT KNEE JOINT: ICD-10-CM

## 2024-05-16 DIAGNOSIS — Z00.00 MEDICARE ANNUAL WELLNESS VISIT, SUBSEQUENT: Primary | ICD-10-CM

## 2024-05-16 DIAGNOSIS — M25.561 CHRONIC KNEE PAIN AFTER TOTAL REPLACEMENT OF RIGHT KNEE JOINT: ICD-10-CM

## 2024-05-16 DIAGNOSIS — I48.0 PAROXYSMAL ATRIAL FIBRILLATION (HCC): ICD-10-CM

## 2024-05-16 DIAGNOSIS — M25.561 CHRONIC ARTHRALGIAS OF KNEES AND HIPS: ICD-10-CM

## 2024-05-16 DIAGNOSIS — M25.552 CHRONIC ARTHRALGIAS OF KNEES AND HIPS: ICD-10-CM

## 2024-05-16 DIAGNOSIS — Z96.651 CHRONIC KNEE PAIN AFTER TOTAL REPLACEMENT OF RIGHT KNEE JOINT: ICD-10-CM

## 2024-05-16 DIAGNOSIS — M25.562 CHRONIC ARTHRALGIAS OF KNEES AND HIPS: ICD-10-CM

## 2024-05-16 DIAGNOSIS — R09.82 POST-NASAL DRIP: ICD-10-CM

## 2024-05-16 DIAGNOSIS — M25.50 PAIN IN JOINT, MULTIPLE SITES: ICD-10-CM

## 2024-05-16 DIAGNOSIS — G89.29 CHRONIC ARTHRALGIAS OF KNEES AND HIPS: ICD-10-CM

## 2024-05-16 DIAGNOSIS — M25.551 CHRONIC ARTHRALGIAS OF KNEES AND HIPS: ICD-10-CM

## 2024-05-16 DIAGNOSIS — M79.605 PAIN IN BOTH LOWER EXTREMITIES: ICD-10-CM

## 2024-05-16 DIAGNOSIS — Z87.898 H/O ABNORMAL WEIGHT LOSS: ICD-10-CM

## 2024-05-16 DIAGNOSIS — I42.0 DILATED CARDIOMYOPATHY (HCC): ICD-10-CM

## 2024-05-16 DIAGNOSIS — M79.604 PAIN IN BOTH LOWER EXTREMITIES: ICD-10-CM

## 2024-05-16 PROCEDURE — 96160 PT-FOCUSED HLTH RISK ASSMT: CPT | Performed by: INTERNAL MEDICINE

## 2024-05-16 PROCEDURE — G0439 PPPS, SUBSEQ VISIT: HCPCS | Performed by: INTERNAL MEDICINE

## 2024-05-16 PROCEDURE — 3008F BODY MASS INDEX DOCD: CPT | Performed by: INTERNAL MEDICINE

## 2024-05-16 PROCEDURE — 99499 UNLISTED E&M SERVICE: CPT | Performed by: INTERNAL MEDICINE

## 2024-05-16 PROCEDURE — 1125F AMNT PAIN NOTED PAIN PRSNT: CPT | Performed by: INTERNAL MEDICINE

## 2024-05-16 PROCEDURE — 1159F MED LIST DOCD IN RCRD: CPT | Performed by: INTERNAL MEDICINE

## 2024-05-16 PROCEDURE — 1170F FXNL STATUS ASSESSED: CPT | Performed by: INTERNAL MEDICINE

## 2024-05-16 PROCEDURE — 3078F DIAST BP <80 MM HG: CPT | Performed by: INTERNAL MEDICINE

## 2024-05-16 PROCEDURE — 3075F SYST BP GE 130 - 139MM HG: CPT | Performed by: INTERNAL MEDICINE

## 2024-05-16 RX ORDER — LEVOCETIRIZINE DIHYDROCHLORIDE 5 MG/1
5 TABLET, FILM COATED ORAL EVERY EVENING
Qty: 30 TABLET | Refills: 5 | Status: SHIPPED | OUTPATIENT
Start: 2024-05-16

## 2024-05-16 NOTE — PROGRESS NOTES
Subjective:   Serg Nagel is a 73 year old male who presents for Physical (MA Supervisit today. )     Patient here for a MA supervisit and fu on paroxysmal a fib, generlaized OA and dilated cardiomyopathy and post nasal drip.  HPI:   Serg Nagel is a 73 year old male who presents for a MA Supervisit.    Patient Active Problem List   Diagnosis    Hyperlipidemia    Atrial flutter (HCC)    Cardiomyopathy (HCC)    Long term (current) use of anticoagulants    Chest pressure    Intermittent lightheadedness    Presence of single chamber implantable cardioverter-defibrillator (ICD)    S/P ablation of atrial fibrillation    S/P AV (atrioventricular) leonela ablation    Sensorineural hearing loss, bilateral    Tear of medial meniscus of right knee, current, initial encounter       General Health                                                   Functional Ability                                                        Functional Status                                     Fall/Risk Assessment                                                              Depression Screening (PHQ-2/PHQ-9): Over the LAST 2 WEEKS                      Advance Directives              Was Medicare Assessment Questionnaire completed by patient and sent to HIM for scanning?Yes    Please go to \"Cognitive Assessment\" under Medicare Assessment section in Charting, test patient and document.  .    Then, refresh your progress note to see your input here.  Cognitive Assessment     What day of the week is this?: Correct    What month is it?: Correct    What year is it?: Correct    Recall \"Ball\": Correct    Recall \"Flag\": Correct    Recall \"Tree\": Correct      ALLERGIES:     Allergies   Allergen Reactions    Seasonal        CURRENT MEDICATIONS:   Current Outpatient Medications   Medication Sig Dispense Refill    levocetirizine 5 MG Oral Tab Take 1 tablet (5 mg total) by mouth every evening. 30 tablet 5    lisinopril 10 MG Oral Tab Take 1 tablet  (10 mg total) by mouth daily. 90 tablet 0    XARELTO 20 MG Oral Tab Take 1 tablet (20 mg total) by mouth daily with food.        MEDICAL INFORMATION:   Past Medical History:    Arrhythmia    Congestive heart disease (HCC)    CVA (cerebral infarction)    Essential hypertension    High blood pressure    High cholesterol    History of blood clot in brain    Other and unspecified hyperlipidemia      Past Surgical History:   Procedure Laterality Date    Cardiac defibrillator placement      Cardiac pacemaker placement      Colonoscopy N/A 4/8/2022    Procedure: COLONOSCOPY;  Surgeon: Handy Dinero MD;  Location: [facility] ENDOSCOPY    Hernia surgery Right 1/7/15    inguinal       Family History   Problem Relation Age of Onset    Lipids Mother     Hypertension Mother     Heart Disorder Father     Diabetes Neg     Cancer Neg     Glaucoma Neg       SOCIAL HISTORY:   Social History     Socioeconomic History    Marital status:    Tobacco Use    Smoking status: Never    Smokeless tobacco: Never    Tobacco comments:     never smoker   Vaping Use    Vaping status: Never Used   Substance and Sexual Activity    Alcohol use: Yes     Alcohol/week: 1.0 standard drink of alcohol     Types: 1 Cans of beer per week     Comment: 3 drinks per week    Drug use: No   Other Topics Concern    Caffeine Concern Yes     Comment: 1 CUP COFFEE     Occ: retired : yes      REVIEW OF SYSTEMS:   GENERAL: feels well otherwise  SKIN: denies any unusual skin lesions  EYES: denies blurred vision or double vision myopia  HEENT: denies nasal congestion, sinus pain or ST post nasal drip  LUNGS: denies shortness of breath with exertion  CARDIOVASCULAR: denies chest pain on exertion  GI: denies abdominal pain, denies heartburn  : 1-2 per night nocturia, no complaint of urinary incontinence  MUSCULOSKELETAL: denies back pain  NEURO: denies headaches  PSYCHE: denies depression or anxiety  HEMATOLOGIC: denies hx of anemia  ENDOCRINE: denies thyroid  history  ALL/ASTHMA:seasonal hx of allergy or asthma    EXAM:   /62   Pulse 61   Temp 97.3 °F (36.3 °C)   Ht 5' 10\" (1.778 m)   Wt 147 lb 12.8 oz (67 kg)   SpO2 99%   BMI 21.21 kg/m²      >   Wt Readings from Last 6 Encounters:   05/16/24 147 lb 12.8 oz (67 kg)   03/29/24 149 lb (67.6 kg)   03/22/24 144 lb (65.3 kg)   02/06/24 151 lb (68.5 kg)   12/28/23 152 lb 12.8 oz (69.3 kg)   11/29/23 152 lb 11.2 oz (69.3 kg)       >   BP Readings from Last 3 Encounters:   05/16/24 130/62   03/29/24 120/60   03/15/24 131/72       GENERAL: well developed, well nourished, in no apparent distress  SKIN: no rashes, no suspicious lesions  HEENT: atraumatic, normocephalic, ears and throat are clear  Hearing assessed via: Whispered Voice normal no wax in ears  EYES: PERRLA, EOMI, conjunctiva are clear              NECK: supple, no adenopathy, no bruits  CHEST: no chest tenderness  BREAST: no masses or discharge  LUNGS: clear to auscultation  CARDIO: RRR without murmur  GI: good BS's, no masses, HSM or tenderness    GUdeferred                          RECTAL: deferred  MUSCULOSKELETAL: back is not tender, FROM of the back  EXTREMITIES: no cyanosis, clubbing or edema.  NEURO: Oriented times three, cranial nerves are intact, motor and sensory are grossly intact    ASSESSMENT AND OTHER RELEVANT CHRONIC CONDITIONS:   Serg Nagel is a 73 year old male who presents for a Medicare Assessment.     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         The patient indicates understanding of these issues and agrees to the plan.  The  patient is asked to return in 12m  for fu.       PREVENTATIVE SERVICES  INDICATIONS AND SCHEDULE Internal Lab or Procedure External Lab or Procedure   Diabetes Screening      HbgA1C   Annually GLYCOHEMOGLOBIN (HgA1c) (L) (%)   Date Value   06/06/2015 5.8         No data to display                Fasting Blood Sugar (FSB)Annually No results found for: \"GLUCOSE\"    Cardiovascular Disease Screening     LDL Annually LDL Cholesterol (mg/dL)   Date Value   03/22/2023 96        EKG One Time     Colorectal Cancer Screening      Colonoscopy Screen every 10 years Health Maintenance   Topic Date Due    Colorectal Cancer Screening  04/08/2025    Update Health Maintenance if applicable    Flex Sigmoidoscopy Screen every 10 years No results found for this or any previous visit.      No data to display                 Fecal Occult Blood Annually No results found for: \"FOB\"      No data to display                Glaucoma Screening      Ophthalmology Visit Annually     Prostate Cancer Screening      PSA  Annually Health Maintenance   Topic Date Due    PSA  03/29/2026     Update Health Maintenance if applicable   Immunizations      Influenza No orders found for this or any previous visit. Update Immunization Activity if applicable    Pneumococcal Orders placed or performed in visit on 08/28/17    Pneumococcal (Prevnar 13) (90559) (DX V03.82/Z23)    Update Immunization Activity if applicable    Hepatitis B No orders found for this or any previous visit. Update Immunization Activity if applicable    Tetanus No orders found for this or any previous visit. Update Immunization Activity if applicable        SPECIFIC DISEASE MONITORING Internal Lab or Procedure External Lab or Procedure   Annual Monitoring of Persistent     Medications (ACE/ARB, digoxin diuretics, anticonvulsants.)    Potassium  Annually Potassium (mmol/L)   Date Value   03/22/2023 5.3 (H)     POTASSIUM (P) (mmol/L)   Date Value   01/23/2016 5.4 (H)         No data to  display                Creatinine  Annually Creatinine (mg/dL)   Date Value   03/22/2023 1.12         No data to display                BUN  Annually BUN (mg/dL)   Date Value   03/22/2023 23 (H)         No data to display                 Drug Serum Conc  Annually No results found for: \"DIGOXIN\", \"DIG\", \"VALP\"      No data to display                Diabetes      HgbA1C  Annually GLYCOHEMOGLOBIN (HgA1c) (L) (%)   Date Value   06/06/2015 5.8         No data to display                Creat/alb ratio  Annually      LDL  Annually LDL Cholesterol (mg/dL)   Date Value   03/22/2023 96         No data to display                 Dilated Eye exam  Annually      No data to display                   No data to display                COPD      Spirometry Testing Annually No results found for this or any previous visit.      No data to display                    SUGGESTED VACCINATIONS - Influenza, Pneumococcal, Zoster, Tetanus     Immunization History   Administered Date(s) Administered    Covid-19 Vaccine Pfizer 30 mcg/0.3 ml 07/28/2021, 08/18/2021   Deferred Date(s) Deferred    FLUAD High Dose 65 yr and older (65908) 10/18/2017, 09/26/2018    Pneumococcal (Prevnar 13) 10/18/2017           History/Other:    Chief Complaint Reviewed and Verified  Nursing Notes Reviewed and   Verified  Tobacco Reviewed  Allergies Reviewed  Medications Reviewed    Problem List Reviewed         Tobacco:  He has never smoked tobacco.    Current Outpatient Medications   Medication Sig Dispense Refill    levocetirizine 5 MG Oral Tab Take 1 tablet (5 mg total) by mouth every evening. 30 tablet 5    lisinopril 10 MG Oral Tab Take 1 tablet (10 mg total) by mouth daily. 90 tablet 0    XARELTO 20 MG Oral Tab Take 1 tablet (20 mg total) by mouth daily with food.           Review of Systems:  Review of Systems      Objective:   /62   Pulse 61   Temp 97.3 °F (36.3 °C)   Ht 5' 10\" (1.778 m)   Wt 147 lb 12.8 oz (67 kg)   SpO2 99%   BMI 21.21  kg/m²  Estimated body mass index is 21.21 kg/m² as calculated from the following:    Height as of this encounter: 5' 10\" (1.778 m).    Weight as of this encounter: 147 lb 12.8 oz (67 kg).  Physical Exam      Assessment & Plan:   1. Medicare annual wellness visit, subsequent (Primary)  check fasting labs  2. Chronic arthralgias of knees and hips - Normal xrays and had 2 independent rheumatology consults  3. 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 Paroxysmal atrial fibrillation (HCC) on Xarelto  5. Pain in joint, multiple sites as above  6. Dilated cardiomyopathy (HCC) compensated  7. H/O abnormal weight loss had normal cxr and colonoscopy 2 years ago  Other orders  -     Levocetirizine Dihydrochloride; Take 1 tablet (5 mg total) by mouth every evening.  Dispense: 30 tablet; Refill: 5  8. Pnd - xyzal 10 mg      No follow-ups on file.    Tara Rodriguez MD, 5/16/2024, 1:16 PM

## 2024-05-18 ENCOUNTER — LAB ENCOUNTER (OUTPATIENT)
Dept: LAB | Age: 74
End: 2024-05-18
Attending: INTERNAL MEDICINE

## 2024-05-18 LAB
ALBUMIN SERPL-MCNC: 4 G/DL (ref 3.2–4.8)
ALBUMIN/GLOB SERPL: 1.4 {RATIO} (ref 1–2)
ALP LIVER SERPL-CCNC: 70 U/L
ALT SERPL-CCNC: 9 U/L
ANION GAP SERPL CALC-SCNC: 4 MMOL/L (ref 0–18)
AST SERPL-CCNC: 18 U/L (ref ?–34)
BASOPHILS # BLD AUTO: 0.06 X10(3) UL (ref 0–0.2)
BASOPHILS NFR BLD AUTO: 0.7 %
BILIRUB SERPL-MCNC: 1.2 MG/DL (ref 0.2–1.1)
BUN BLD-MCNC: 27 MG/DL (ref 9–23)
BUN/CREAT SERPL: 28.1 (ref 10–20)
CALCIUM BLD-MCNC: 9.7 MG/DL (ref 8.7–10.4)
CHLORIDE SERPL-SCNC: 108 MMOL/L (ref 98–112)
CHOLEST SERPL-MCNC: 142 MG/DL (ref ?–200)
CO2 SERPL-SCNC: 30 MMOL/L (ref 21–32)
CREAT BLD-MCNC: 0.96 MG/DL
DEPRECATED RDW RBC AUTO: 52.3 FL (ref 35.1–46.3)
EGFRCR SERPLBLD CKD-EPI 2021: 82 ML/MIN/1.73M2 (ref 60–?)
EOSINOPHIL # BLD AUTO: 0.58 X10(3) UL (ref 0–0.7)
EOSINOPHIL NFR BLD AUTO: 6.4 %
ERYTHROCYTE [DISTWIDTH] IN BLOOD BY AUTOMATED COUNT: 15.1 % (ref 11–15)
FASTING PATIENT LIPID ANSWER: YES
FASTING STATUS PATIENT QL REPORTED: YES
GLOBULIN PLAS-MCNC: 2.8 G/DL (ref 2–3.5)
GLUCOSE BLD-MCNC: 80 MG/DL (ref 70–99)
HCT VFR BLD AUTO: 46.9 %
HDLC SERPL-MCNC: 33 MG/DL (ref 40–59)
HGB BLD-MCNC: 15.1 G/DL
IMM GRANULOCYTES # BLD AUTO: 0.08 X10(3) UL (ref 0–1)
IMM GRANULOCYTES NFR BLD: 0.9 %
LDLC SERPL CALC-MCNC: 96 MG/DL (ref ?–100)
LYMPHOCYTES # BLD AUTO: 1.5 X10(3) UL (ref 1–4)
LYMPHOCYTES NFR BLD AUTO: 16.4 %
MCH RBC QN AUTO: 30.4 PG (ref 26–34)
MCHC RBC AUTO-ENTMCNC: 32.2 G/DL (ref 31–37)
MCV RBC AUTO: 94.4 FL
MONOCYTES # BLD AUTO: 0.64 X10(3) UL (ref 0.1–1)
MONOCYTES NFR BLD AUTO: 7 %
NEUTROPHILS # BLD AUTO: 6.27 X10 (3) UL (ref 1.5–7.7)
NEUTROPHILS # BLD AUTO: 6.27 X10(3) UL (ref 1.5–7.7)
NEUTROPHILS NFR BLD AUTO: 68.6 %
NONHDLC SERPL-MCNC: 109 MG/DL (ref ?–130)
OSMOLALITY SERPL CALC.SUM OF ELEC: 298 MOSM/KG (ref 275–295)
PLATELET # BLD AUTO: 258 10(3)UL (ref 150–450)
POTASSIUM SERPL-SCNC: 5.2 MMOL/L (ref 3.5–5.1)
PROT SERPL-MCNC: 6.8 G/DL (ref 5.7–8.2)
RBC # BLD AUTO: 4.97 X10(6)UL
SODIUM SERPL-SCNC: 142 MMOL/L (ref 136–145)
TRIGL SERPL-MCNC: 61 MG/DL (ref 30–149)
TSI SER-ACNC: 1.08 MIU/ML (ref 0.55–4.78)
VLDLC SERPL CALC-MCNC: 10 MG/DL (ref 0–30)
WBC # BLD AUTO: 9.1 X10(3) UL (ref 4–11)

## 2024-05-18 PROCEDURE — 84443 ASSAY THYROID STIM HORMONE: CPT | Performed by: INTERNAL MEDICINE

## 2024-05-18 PROCEDURE — 80061 LIPID PANEL: CPT | Performed by: INTERNAL MEDICINE

## 2024-05-18 PROCEDURE — 85025 COMPLETE CBC W/AUTO DIFF WBC: CPT | Performed by: INTERNAL MEDICINE

## 2024-05-18 PROCEDURE — 36415 COLL VENOUS BLD VENIPUNCTURE: CPT | Performed by: INTERNAL MEDICINE

## 2024-05-18 PROCEDURE — 80053 COMPREHEN METABOLIC PANEL: CPT | Performed by: INTERNAL MEDICINE

## 2024-05-21 ENCOUNTER — HOSPITAL ENCOUNTER (OUTPATIENT)
Dept: ULTRASOUND IMAGING | Facility: HOSPITAL | Age: 74
Discharge: HOME | End: 2024-05-21
Attending: INTERNAL MEDICINE

## 2024-05-21 DIAGNOSIS — M79.605 PAIN IN BOTH LOWER EXTREMITIES: ICD-10-CM

## 2024-05-21 DIAGNOSIS — M79.604 PAIN IN BOTH LOWER EXTREMITIES: ICD-10-CM

## 2024-05-21 PROCEDURE — 93923 UPR/LXTR ART STDY 3+ LVLS: CPT | Performed by: INTERNAL MEDICINE

## 2024-07-16 RX ORDER — LISINOPRIL 10 MG/1
10 TABLET ORAL DAILY
Qty: 90 TABLET | Refills: 0 | Status: SHIPPED | OUTPATIENT
Start: 2024-07-16

## 2024-07-20 ENCOUNTER — HOSPITAL ENCOUNTER (OUTPATIENT)
Age: 74
Discharge: HOME | End: 2024-07-20
Payer: MEDICARE

## 2024-07-20 VITALS
SYSTOLIC BLOOD PRESSURE: 122 MMHG | RESPIRATION RATE: 20 BRPM | TEMPERATURE: 97 F | HEART RATE: 60 BPM | DIASTOLIC BLOOD PRESSURE: 57 MMHG | OXYGEN SATURATION: 98 %

## 2024-07-20 DIAGNOSIS — T78.40XA ALLERGIC REACTION, INITIAL ENCOUNTER: Primary | ICD-10-CM

## 2024-07-20 RX ORDER — TRIAMCINOLONE ACETONIDE 1 MG/G
CREAM TOPICAL 2 TIMES DAILY
Qty: 45 G | Refills: 0 | Status: SHIPPED | OUTPATIENT
Start: 2024-07-20 | End: 2024-07-30

## 2024-07-20 RX ORDER — METHYLPREDNISOLONE 4 MG/1
TABLET ORAL
Qty: 1 EACH | Refills: 0 | Status: SHIPPED | OUTPATIENT
Start: 2024-07-20

## 2024-07-20 NOTE — ED PROVIDER NOTES
Patient Seen in: [facility]      History     Chief Complaint   Patient presents with    Rash Skin Problem     Stated Complaint: rash    Subjective: This is a 73-year-old male, past medical history of congestive heart disease, hypertension, hyperlipidemia, CVA, arrhythmia (on Xarelto), presents to immediate care for bilateral upper extremities and abdomen for the past 4 days.  Positive pruritus without pain.  Denies known causative agent.  No new soaps, lotions, detergents, bedding, sheets, clothes etc.  Patient has not been taking any thing over-the-counter or applying any creams to area.  Denies any recent yardwork, hiking, exposure to poison ivy, oak, etc.  Patient is well-appearing.  No involvement of face, mucous membranes, genitals.  Moving air well.  Speaking complete full sentences without difficulty.  No excessive drooling, stridor, wheezing.  AOx4  The history is provided by the patient.           Objective:   Past Medical History:    Arrhythmia    Congestive heart disease (HCC)    CVA (cerebral infarction)    Essential hypertension    High blood pressure    High cholesterol    History of blood clot in brain    Other and unspecified hyperlipidemia              Past Surgical History:   Procedure Laterality Date    Cardiac defibrillator placement      Cardiac pacemaker placement      Colonoscopy N/A 4/8/2022    Procedure: COLONOSCOPY;  Surgeon: Handy Dinero MD;  [facility] ENDOSCOPY    Hernia surgery Right 1/7/15    inguinal                 Social History     Socioeconomic History    Marital status:    Tobacco Use    Smoking status: Never    Smokeless tobacco: Never    Tobacco comments:     never smoker   Vaping Use    Vaping status: Never Used   Substance and Sexual Activity    Alcohol use: Yes     Alcohol/week: 1.0 standard drink of alcohol     Types: 1 Cans of beer per week     Comment: 3 drinks per week    Drug use: No   Other Topics Concern    Caffeine Concern Yes      Comment: 1 CUP COFFEE              Review of Systems   Constitutional: Negative.    Respiratory: Negative.     Cardiovascular: Negative.    Gastrointestinal: Negative.    Skin:  Positive for rash.   Neurological: Negative.        Positive for stated Chief Complaint: Rash Skin Problem    Other systems are as noted in HPI.  Constitutional and vital signs reviewed.      All other systems reviewed and negative except as noted above.    Physical Exam     ED Triage Vitals [07/20/24 0836]   /57   Pulse 60   Resp 20   Temp 97.1 °F (36.2 °C)   Temp src Temporal   SpO2 98 %   O2 Device None (Room air)       Current Vitals:   Vital Signs  BP: 122/57  Pulse: 60  Resp: 20  Temp: 97.1 °F (36.2 °C)  Temp src: Temporal    Oxygen Therapy  SpO2: 98 %  O2 Device: None (Room air)            Physical Exam  Constitutional:       General: He is not in acute distress.     Appearance: Normal appearance. He is not ill-appearing.   HENT:      Head: Normocephalic.   Cardiovascular:      Rate and Rhythm: Normal rate.      Pulses: Normal pulses.   Pulmonary:      Effort: Pulmonary effort is normal.   Musculoskeletal:         General: Normal range of motion.      Cervical back: Normal range of motion.   Skin:     General: Skin is warm.      Capillary Refill: Capillary refill takes less than 2 seconds.      Findings: Rash present. Rash is urticarial.             Comments: Urticarial rash to abdomen.  Erythema to bilateral upper extremities.  No linear pattern or burrowing.  No vesicular lesions, crusting, scaling.  Positive pruritus without pain.  Pulses intact.   Neurological:      General: No focal deficit present.      Mental Status: He is alert and oriented to person, place, and time.               ED Course   Labs Reviewed - No data to display                   MDM      Differentials considered include: Allergic reaction, contact dermatitis, shingles, Scabies.    Patient with positive pruritus.  Not worse at night.  There is no  burrowing to webspaces of the fingers or flexor aspects of wrist.  There is involvement of periumbilical region, however, it is urticarial in nature.  No linear burrows, papules, pustules.  Low suspicion for scabies.    Rash does not follow dermatome.  Not painful.  Does not appear to be vesicular in nature.  Very low suspicion for shingles.    High clinical suspicion for allergic reaction versus dermatitis.  Rash is widespread to bilateral upper extremities, abdomen and torso.  Will prescribe short course of oral steroids to be started today.  Patient is aware about tapered dose.  He is aware to take daily for 6 days in the morning with food and water.  He is aware of common adverse side effects of oral steroids.  He verbalized understanding.    Encourage patient to avoid itching area then touching nose, mouth, eyes, face, genitals.  He is aware he can take over-the-counter allergy medication, Benadryl, apply topical anti-itch creams, take Benadryl as needed to alleviate itching.  He is also aware he can apply ice to help alleviate itching.    Discussed with patient that if symptoms persist beyond Medrol Dosepak, topical corticosteroid prescribed.  He is also aware if no improvement after 7 to 10 days.  He should follow-up with his PCP or dermatologist.  He verbalized understand agrees with plan of care.                                   Medical Decision Making      Disposition and Plan     Clinical Impression:  1. Allergic reaction, initial encounter         Disposition:  Discharge  7/20/2024  8:49 am    Follow-up:  Tara Rodriguez MD  [address] [facility] [address]  [phone]      If symptoms worsen    [facility] [place] [address] [facility] [address]  [facility] [address] [phone]    If symptoms worsen - this is a dermatologist you can follow up with          Medications Prescribed:  Discharge Medication List as of 7/20/2024  8:49 AM        START taking these medications    Details    methylPREDNISolone (MEDROL) 4 MG Oral Tablet Therapy Pack Dosepack: take as directed, Normal, Disp-1 each, R-0      triamcinolone 0.1 % External Cream Apply topically 2 (two) times daily for 10 days., Normal, Disp-45 g, R-0

## 2024-07-20 NOTE — DISCHARGE INSTRUCTIONS
Start taking steroids today. You will take daily in the morning with food and water. It will be a tapered dose over the next 6 days, Please be aware all steroids can cause you to feel like you drank too much caffeine and increase your heart rate. It may cause problems sleeping if taken too late in the day.     For itching, you may apply over the counter anti itch cream or ice. You may also take benadryl.     Avoid itching area then touching nose, mouth, eyes, face etc.    After completion of oral steroids if there is any remaining rash, you may use topical ointment,.    Follow up with your PCP or dermatology if there is no resolution of rash.

## 2024-09-05 ENCOUNTER — TELEPHONE (OUTPATIENT)
Facility: CLINIC | Age: 74
End: 2024-09-05

## 2024-09-05 DIAGNOSIS — Z12.11 COLON CANCER SCREENING: Primary | ICD-10-CM

## 2024-09-05 DIAGNOSIS — Z86.0100 HISTORY OF COLON POLYPS: ICD-10-CM

## 2024-09-05 NOTE — TELEPHONE ENCOUNTER
Patient states is due for his colonoscopy. He states its been 3 years since his last one. Please advise.

## 2024-09-06 NOTE — TELEPHONE ENCOUNTER
Dr Dinero,   Patient called and stated he is due for colonoscopy, please advise on orders.   See message below:        4/13/22  1:48 PM  Note  Health Maintenance Updated.     3 year colonoscopy recall entered into patient outreach in [facility].  Next colonoscopy will be due 4/8/2025.

## 2024-09-09 NOTE — TELEPHONE ENCOUNTER
Colonoscopy for hx colon polyps and CRC screening  Golytely   MAC     Hold xaralto if still on the medication

## 2024-09-10 NOTE — TELEPHONE ENCOUNTER
Scheduled for:  Colonoscopy [address]  Provider Name:  Dr. Dinero  Date:  12/30/2024  Location:   [facility]  Sedation:  MAC  Time:  9:30 AM - Patient is aware EM will call the day before with arrival time.  Prep:  Golytely  Meds/Allergies Reconciled?:  Physician reviewed   Diagnosis with codes:  Colon cancer screening Z12.11; Hx: Colon polyps Z86.010  Was patient informed to call insurance with codes (Y/N):  Yes, I confirmed [employer] insurance with the patient.   Referral sent?:  Referral was sent at the time of electronic surgical scheduling.   EM or [facility] notified?:  I sent an electronic request to Endo Scheduling and received a confirmation today.   Medication Orders:  Patient confirmed he is taking Xarelto. GI RN will obtain Xarelto orders from Dr. Davis prior to the procedure.  Misc Orders:  N/A     Further instructions given by staff: I discussed the prep instructions with the patient which he verbally understood and is aware that I will send the instructions today.

## 2024-09-10 NOTE — TELEPHONE ENCOUNTER
Dr. Dinero -    Patient asked to be seen before his 4/8/2025 recall date due to having symptoms - is this ok?    Please advise - thank you!

## 2024-09-10 NOTE — TELEPHONE ENCOUNTER
GI RNS -    Patient is scheduled for a colonoscopy with Dr. Dinero on 12/30/2024 at [facility]. Please obtain Xarelto orders from Dr. Davis prior to the procedure.    Thank you!

## 2024-09-10 NOTE — TELEPHONE ENCOUNTER
Left message to call back.   Please transfer to triage regarding symptoms and if patient needs to be seen sooner.   Thank you.

## 2024-09-11 NOTE — TELEPHONE ENCOUNTER
Josefa Dinero  I spoke to Serg  He denies any Gi symptoms right now no pain, no bleeding, and told me that he is feeling fine.    Thank you    I faxed blood thinner hold and clearance to [facility]   Await response.  I entered a remind me encounter for follow up closer to procedure date as well.

## 2024-09-17 ENCOUNTER — PATIENT MESSAGE (OUTPATIENT)
Dept: INTERNAL MEDICINE CLINIC | Facility: CLINIC | Age: 74
End: 2024-09-17

## 2024-09-17 NOTE — TELEPHONE ENCOUNTER
From: Serg Nagel  To: Tara Rodriguez  Sent: 9/17/2024 4:08 PM CDT  Subject: Referral for gastroscopy    To lisandra bal [facility] zone.  Prosze o bernard na gastroscopie.  Chepe Nagel

## 2024-09-17 NOTE — TELEPHONE ENCOUNTER
Left a message by Polish  Callum # [phone] on Linda's voice mail to call the office.       Triage symptoms.

## 2024-10-02 NOTE — PATIENT INSTRUCTIONS
Increase lisinopril to 5 mg at HS    Continue all your same medications    Call if having any dizziness, lightheadedness, heart racing, palpitations, chest pain, shortness of breath, coughing, swelling, weight gain, fever, chills or worsening symptoms. Performed Resulted

## 2024-10-14 RX ORDER — LISINOPRIL 10 MG/1
10 TABLET ORAL DAILY
Qty: 90 TABLET | Refills: 0 | Status: SHIPPED | OUTPATIENT
Start: 2024-10-14

## 2024-11-26 ENCOUNTER — OFFICE VISIT (OUTPATIENT)
Dept: INTERNAL MEDICINE CLINIC | Facility: CLINIC | Age: 74
End: 2024-11-26
Payer: MEDICARE

## 2024-11-26 VITALS
HEIGHT: 70 IN | OXYGEN SATURATION: 98 % | BODY MASS INDEX: 21.33 KG/M2 | WEIGHT: 149 LBS | SYSTOLIC BLOOD PRESSURE: 120 MMHG | HEART RATE: 63 BPM | DIASTOLIC BLOOD PRESSURE: 70 MMHG

## 2024-11-26 DIAGNOSIS — I10 HYPERTENSION, UNSPECIFIED TYPE: ICD-10-CM

## 2024-11-26 DIAGNOSIS — I48.0 PAROXYSMAL ATRIAL FIBRILLATION (HCC): ICD-10-CM

## 2024-11-26 DIAGNOSIS — I42.0 DILATED CARDIOMYOPATHY (HCC): ICD-10-CM

## 2024-11-26 DIAGNOSIS — M25.50 PAIN IN JOINT, MULTIPLE SITES: Primary | ICD-10-CM

## 2024-11-26 PROCEDURE — 3008F BODY MASS INDEX DOCD: CPT | Performed by: INTERNAL MEDICINE

## 2024-11-26 PROCEDURE — 99214 OFFICE O/P EST MOD 30 MIN: CPT | Performed by: INTERNAL MEDICINE

## 2024-11-26 PROCEDURE — 3078F DIAST BP <80 MM HG: CPT | Performed by: INTERNAL MEDICINE

## 2024-11-26 PROCEDURE — 3074F SYST BP LT 130 MM HG: CPT | Performed by: INTERNAL MEDICINE

## 2024-11-26 PROCEDURE — 1159F MED LIST DOCD IN RCRD: CPT | Performed by: INTERNAL MEDICINE

## 2024-11-26 NOTE — PROGRESS NOTES
Subjective:   Serg Nagel is a 74 year old male who presents for Follow - Up     Patient here for a fu on diffuse arhtralgias involving the small joints of the hands and right knee.  Had a workup for inflammatory arhtritis which did not yield a diagnosis. Has changed diet and feels better.  Continues to have slight weight loss.  Is scheduled for colonoscopy and EGD.  History/Other:    Chief Complaint Reviewed and Verified  No Further Nursing Notes to   Review  Medications Reviewed  Problem List Reviewed         Tobacco:  He has never smoked tobacco.    Current Outpatient Medications   Medication Sig Dispense Refill    LISINOPRIL 10 MG Oral Tab TAKE 1 TABLET BY MOUTH EVERY DAY 90 tablet 0    XARELTO 20 MG Oral Tab Take 1 tablet (20 mg total) by mouth daily with food.           Review of Systems:  Review of Systems   Constitutional:  Positive for unexpected weight change. Negative for fatigue.   Respiratory:  Negative for shortness of breath.    Cardiovascular:  Negative for chest pain, palpitations and leg swelling.   Gastrointestinal: Negative.    Musculoskeletal:  Positive for arthralgias (hands and right knee).   Neurological:  Negative for weakness and numbness.   Psychiatric/Behavioral: Negative.           Objective:   /70   Pulse 63   Ht 5' 10\" (1.778 m)   Wt 149 lb (67.6 kg)   SpO2 98%   BMI 21.38 kg/m²  Estimated body mass index is 21.38 kg/m² as calculated from the following:    Height as of this encounter: 5' 10\" (1.778 m).    Weight as of this encounter: 149 lb (67.6 kg).  Physical Exam  Constitutional:       Comments: Thin body habitus   HENT:      Head: Normocephalic and atraumatic.   Eyes:      General: No scleral icterus.     Pupils: Pupils are equal, round, and reactive to light.   Neck:      Vascular: No carotid bruit.   Cardiovascular:      Rate and Rhythm: Normal rate and regular rhythm.      Heart sounds:      No gallop.      Comments: PPM in left chest wall  Pulmonary:       Effort: Pulmonary effort is normal.      Breath sounds: Normal breath sounds.   Abdominal:      General: There is no distension.      Palpations: Abdomen is soft. There is no mass.      Tenderness: There is no abdominal tenderness.   Musculoskeletal:         General: Tenderness (right medial knee and left wrist and right foot) present.      Cervical back: Neck supple.      Right lower leg: No edema.      Left lower leg: No edema.   Lymphadenopathy:      Cervical: No cervical adenopathy.   Skin:     Findings: No lesion.   Neurological:      Mental Status: He is alert. Mental status is at baseline.   Psychiatric:         Thought Content: Thought content normal.           Assessment & Plan:   1. Pain in joint, multiple sites (Primary) recheck inflammatory markers  2. Dilated cardiomyopathy (HCC) - compensated with lisinpril  3. Hypertension, unspecified type controlled to goal  4. Paroxysmal a fib - on xarelto      No follow-ups on file.    Tara Rodriguez MD, 11/26/2024, 11:36 AM

## 2024-11-27 ENCOUNTER — LAB ENCOUNTER (OUTPATIENT)
Dept: LAB | Age: 74
End: 2024-11-27
Attending: INTERNAL MEDICINE
Payer: MEDICARE

## 2024-11-27 LAB
CRP SERPL-MCNC: 2.7 MG/DL (ref ?–1)
ERYTHROCYTE [SEDIMENTATION RATE] IN BLOOD: 17 MM/HR
URATE SERPL-MCNC: 5.3 MG/DL

## 2024-11-27 PROCEDURE — 86200 CCP ANTIBODY: CPT | Performed by: INTERNAL MEDICINE

## 2024-11-27 PROCEDURE — 36415 COLL VENOUS BLD VENIPUNCTURE: CPT | Performed by: INTERNAL MEDICINE

## 2024-11-27 PROCEDURE — 86140 C-REACTIVE PROTEIN: CPT | Performed by: INTERNAL MEDICINE

## 2024-11-27 PROCEDURE — 84550 ASSAY OF BLOOD/URIC ACID: CPT | Performed by: INTERNAL MEDICINE

## 2024-11-27 PROCEDURE — 85652 RBC SED RATE AUTOMATED: CPT | Performed by: INTERNAL MEDICINE

## 2024-11-27 NOTE — TELEPHONE ENCOUNTER
Fax received from Dr. Cody Davis with signature dated 11/22/24    \"Serg Nagel is at LOW risk for isacc/post operative cardiac event Type of Procedure: Colonoscopy\"  \"Cardiac risk assessment-acceptable risk for colonoscopy and may proceed without cardiac testing.  Ejection fraction was 52% in May 2023.  He may hold his Xarelto for 48 hours with small incremental risk.  He will resume it as soon as possible post op\"    I sent this to scanning

## 2024-11-29 DIAGNOSIS — M19.90 INFLAMMATORY ARTHRITIS: Primary | ICD-10-CM

## 2024-11-29 LAB — CCP IGG SERPL-ACNC: 1.6 U/ML (ref 0–6.9)

## 2024-12-30 ENCOUNTER — HOSPITAL ENCOUNTER (OUTPATIENT)
Facility: HOSPITAL | Age: 74
Setting detail: HOSPITAL OUTPATIENT SURGERY
Discharge: HOME | End: 2024-12-30
Attending: INTERNAL MEDICINE | Admitting: INTERNAL MEDICINE
Payer: MEDICARE

## 2024-12-30 ENCOUNTER — TELEPHONE (OUTPATIENT)
Facility: CLINIC | Age: 74
End: 2024-12-30

## 2024-12-30 ENCOUNTER — ANESTHESIA EVENT (OUTPATIENT)
Dept: ENDOSCOPY | Facility: HOSPITAL | Age: 74
End: 2024-12-30
Payer: MEDICARE

## 2024-12-30 ENCOUNTER — ANESTHESIA (OUTPATIENT)
Dept: ENDOSCOPY | Facility: HOSPITAL | Age: 74
End: 2024-12-30
Payer: MEDICARE

## 2024-12-30 VITALS
HEART RATE: 60 BPM | TEMPERATURE: 97 F | WEIGHT: 149 LBS | SYSTOLIC BLOOD PRESSURE: 133 MMHG | DIASTOLIC BLOOD PRESSURE: 67 MMHG | RESPIRATION RATE: 22 BRPM | BODY MASS INDEX: 21.33 KG/M2 | OXYGEN SATURATION: 97 % | HEIGHT: 70 IN

## 2024-12-30 DIAGNOSIS — Z86.0100 HISTORY OF COLON POLYPS: ICD-10-CM

## 2024-12-30 DIAGNOSIS — Z12.11 COLON CANCER SCREENING: ICD-10-CM

## 2024-12-30 PROCEDURE — 0DB78ZX EXCISION OF STOMACH, PYLORUS, VIA NATURAL OR ARTIFICIAL OPENING ENDOSCOPIC, DIAGNOSTIC: ICD-10-PCS | Performed by: INTERNAL MEDICINE

## 2024-12-30 PROCEDURE — 45385 COLONOSCOPY W/LESION REMOVAL: CPT | Performed by: INTERNAL MEDICINE

## 2024-12-30 PROCEDURE — 45380 COLONOSCOPY AND BIOPSY: CPT | Performed by: INTERNAL MEDICINE

## 2024-12-30 PROCEDURE — 43239 EGD BIOPSY SINGLE/MULTIPLE: CPT | Performed by: INTERNAL MEDICINE

## 2024-12-30 PROCEDURE — 0DBK8ZX EXCISION OF ASCENDING COLON, VIA NATURAL OR ARTIFICIAL OPENING ENDOSCOPIC, DIAGNOSTIC: ICD-10-PCS | Performed by: INTERNAL MEDICINE

## 2024-12-30 PROCEDURE — 0DBP8ZX EXCISION OF RECTUM, VIA NATURAL OR ARTIFICIAL OPENING ENDOSCOPIC, DIAGNOSTIC: ICD-10-PCS | Performed by: INTERNAL MEDICINE

## 2024-12-30 RX ORDER — LIDOCAINE HYDROCHLORIDE 10 MG/ML
INJECTION, SOLUTION EPIDURAL; INFILTRATION; INTRACAUDAL; PERINEURAL AS NEEDED
Status: DISCONTINUED | OUTPATIENT
Start: 2024-12-30 | End: 2024-12-30 | Stop reason: SURG

## 2024-12-30 RX ORDER — SODIUM CHLORIDE, SODIUM LACTATE, POTASSIUM CHLORIDE, CALCIUM CHLORIDE 600; 310; 30; 20 MG/100ML; MG/100ML; MG/100ML; MG/100ML
INJECTION, SOLUTION INTRAVENOUS CONTINUOUS
Status: DISCONTINUED | OUTPATIENT
Start: 2024-12-30 | End: 2024-12-30

## 2024-12-30 RX ADMIN — LIDOCAINE HYDROCHLORIDE 50 MG: 10 INJECTION, SOLUTION EPIDURAL; INFILTRATION; INTRACAUDAL; PERINEURAL at 09:25:00

## 2024-12-30 NOTE — TELEPHONE ENCOUNTER
----- Message from Handy Dinero sent at 12/30/2024  3:27 PM CST -----  I wanted to get back to you with your colonoscopy and EGD results.  You had 2 colon polyps removed which were benign.  I would advise a repeat colonoscopy in 5 years to make sure no new polyps are forming.  You also have internal hemorrhoids and diverticulosis.      The upper endoscopy showed a hiatal hernia, avoid eating for 3 hours before bed. Samples taken from the stomach were negative for h pylori infection.

## 2024-12-30 NOTE — DISCHARGE INSTRUCTIONS
Home Care Instructions for Colonoscopy and/or Gastroscopy with Sedation    Diet:  - Resume your regular diet as tolerated unless otherwise instructed.  - Start with light meals to minimize bloating.  - Do not drink alcohol today.    Medication:  - If you have questions about resuming your normal medications, please contact your Primary Care Physician.  - Xarelto: Tomorrow    Activities:  - Take it easy today. Do not return to work today.  - Do not drive today.  - Do not operate any machinery today (including kitchen equipment).    Colonoscopy:  - You may notice some rectal \"spotting\" (a little blood on the toilet tissue) for a day or two after the exam. This is normal.  - If you experience any rectal bleeding (not spotting), persistent tenderness or sharp severe abdominal pains, oral temperature over 100 degrees Fahrenheit, light-headedness or dizziness, or any other problems, contact your doctor.    Gastroscopy:  - You may have a sore throat for 2-3 days following the exam. This is normal. Gargling with warm salt water (1/2 tsp salt to 1 glass warm water) or using throat lozenges will help.  - If you experience any sharp pain in your neck, abdomen or chest, vomiting of blood, oral temperature over 100 degrees Fahrenheit, light-headedness or dizziness, or any other problems, contact your doctor.    **If unable to reach your doctor, please go to the [facility] Emergency Room**    - Your referring physician will receive a full report of your examination.  - If you do not hear from your doctor's office within two weeks of your biopsy, please call them for your results.    You may be able to see your laboratory results in [facility] between 4 and 7 business days.  In some cases, your physician may not have viewed the results before they are released to [facility].  If you have questions regarding your results contact the physician who ordered the test/exam by phone or via [facility] by choosing \"Ask a Medical  Question.\"

## 2024-12-30 NOTE — ANESTHESIA PREPROCEDURE EVALUATION
Anesthesia PreOp Note    HPI:     Serg Nagel is a 74 year old male who presents for preoperative consultation requested by: Handy Dinero MD    Date of Surgery: 12/30/2024    Procedure(s):  COLONOSCOPY  Indication: Screen for colon cancer    Relevant Problems   No relevant active problems       NPO:                         History Review:  Patient Active Problem List    Diagnosis Date Noted    Tear of medial meniscus of right knee, current, initial encounter 03/22/2024    Sensorineural hearing loss, bilateral 07/10/2019    Chest pressure 10/26/2018    Intermittent lightheadedness 10/26/2018    Presence of single chamber implantable cardioverter-defibrillator (ICD) 10/26/2018    S/P ablation of atrial fibrillation 10/22/2018    S/P AV (atrioventricular) leonela ablation 10/19/2018    Long term (current) use of anticoagulants 01/25/2017    Cardiomyopathy (HCC) 01/19/2017    Atrial flutter (HCC) 06/03/2015    Hyperlipidemia 09/11/2014       Past Medical History:    Arrhythmia    Congestive heart disease (HCC)    CVA (cerebral infarction)    Essential hypertension    High blood pressure    High cholesterol    History of blood clot in brain    Other and unspecified hyperlipidemia    Visual impairment       Past Surgical History:   Procedure Laterality Date    Cardiac defibrillator placement      Cardiac pacemaker placement      Colonoscopy N/A 04/08/2022    Procedure: COLONOSCOPY;  Surgeon: Handy Dinero MD;  Location: [facility] ENDOSCOPY    Hernia surgery Bilateral 01/07/2015    inguinal        Medications Prior to Admission   Medication Sig Dispense Refill Last Dose/Taking    LISINOPRIL 10 MG Oral Tab TAKE 1 TABLET BY MOUTH EVERY DAY 90 tablet 0 Taking    XARELTO 20 MG Oral Tab Take 1 tablet (20 mg total) by mouth daily with food.   Taking     No current Epic-ordered facility-administered medications on file.     No current Epic-ordered outpatient medications on file.       Allergies   Allergen Reactions     Seasonal        Family History   Problem Relation Age of Onset    Lipids Mother     Hypertension Mother     Heart Disorder Father     Diabetes Neg     Cancer Neg     Glaucoma Neg      Social History     Socioeconomic History    Marital status:    Tobacco Use    Smoking status: Never    Smokeless tobacco: Never    Tobacco comments:     never smoker   Vaping Use    Vaping status: Never Used   Substance and Sexual Activity    Alcohol use: Yes     Alcohol/week: 1.0 standard drink of alcohol     Types: 1 Cans of beer per week    Drug use: No   Other Topics Concern    Caffeine Concern Yes     Comment: 1 CUP COFFEE       Available pre-op labs reviewed.                 Vital Signs:  Body mass index is 21.38 kg/m².   height is 1.778 m (5' 10\") and weight is 67.6 kg (149 lb).   Vitals:    12/23/24 1327   Weight: 67.6 kg (149 lb)   Height: 1.778 m (5' 10\")        Anesthesia Evaluation     Patient summary reviewed and Nursing notes reviewed    Airway   Mallampati: II  TM distance: >3 FB  Neck ROM: full  Dental    (+) partials    Comment: Risk of dental injury discussed  With pt  Upper and lower partials removed.    Pulmonary - negative ROS   Cardiovascular   Exercise tolerance: good  (+) pacemaker, hypertension, valvular problems/murmurs, CHF    ROS comment: EF 35-40%    Neuro/Psych    (+)  TIA,        GI/Hepatic/Renal    (+) bowel prep    Endo/Other - negative ROS   Abdominal     Abdomen: soft.                 Anesthesia Plan:   ASA:  3  Plan:   MAC  Informed Consent Plan and Risks Discussed With:  Patient  Use of Blood Products Discussed With:  Patient  Discussed plan with:  Surgeon      I have informed Serg Mylesluhnhung and/or legal guardian or family member of the nature of the anesthetic plan, benefits, risks including possible dental damage if relevant, major complications, and any alternative forms of anesthetic management.   All of the patient's questions were answered to the best of my ability. The patient  desires the anesthetic management as planned.  Hien Nunes CRNA  12/30/2024 08:10 AM  Present on Admission:  **None**

## 2024-12-30 NOTE — TELEPHONE ENCOUNTER
Dr. Dinero called.  Patient claims he was to have an EGD and his primary care did the prior approval.  I only see a colonoscopy on file as authorized.  I called his [employer] plan and spoke to representative Shaneka  Call reference number is [id number]  She told me that it does require a prior approval and it has to go through [facility] at  # [phone]  I then spoke to Anish.  I submitted as expedited/urgent status since he is holding blood thinner.  Status Approved tracking number [id number] and Authorization number [id number]  It is valid starting today and for next 30 days    It is for R63.4 abnormal weight loss (diagnosis code given verbally to RN from Dr. Dinero) and CPT 09622    I notified endoscopy and they will let Dr. Dinero know.

## 2024-12-30 NOTE — OR NURSING
Spoke with Sherif from [employer]    Per [employer] (formerly [facility] [facility] customer service,  [phone]  All [employer] devices are compatible with magnet use.   For pacemakers, once the magnet is placed the device will pace asynchronously at  BPM. The device will return to normal programming once the magnet is removed.  For ICDs, magnet application suspends high voltage therapy. Newer devices may make an audible tone upon magnet application and removal. The device will return back to normal once the magnet is removed.

## 2024-12-30 NOTE — ANESTHESIA POSTPROCEDURE EVALUATION
Patient: Serg Nagel    Procedure Summary       Date: 12/30/24 Room / Location: [facility] ENDOSCOPY 03 / EM ENDOSCOPY    Anesthesia Start: 0921 Anesthesia Stop: 0959    Procedures:       COLONOSCOPY      ESOPHAGOGASTRODUODENOSCOPY (EGD) Diagnosis:       Colon cancer screening      History of colon polyps      (polyps, hemorrhoids, hiatal hernia, gastritis)    Surgeons: Handy Dinero MD Anesthesiologist: Hien Nunes CRNA    Anesthesia Type: MAC ASA Status: 3            Anesthesia Type: MAC    Vitals Value Taken Time   /58 12/30/24 0959   Temp 97.2 °F (36.2 °C) 12/30/24 0959   Pulse 60 12/30/24 0959   Resp 19 12/30/24 0959   SpO2 95 % 12/30/24 0959       [facility] AN Post Evaluation:   Patient Evaluated in PACU  Patient Participation: complete - patient participated  Level of Consciousness: sleepy but conscious  Pain Score: 0  Pain Management: adequate  Airway Patency:patent  Dental exam unchanged from preop  Yes    Nausea/Vomiting: none  Cardiovascular Status: blood pressure returned to baseline, hemodynamically stable and stable  Respiratory Status: acceptable  Postoperative Hydration acceptable      Hien Nunes CRNA  12/30/2024 10:00 AM

## 2024-12-30 NOTE — H&P
History & Physical Examination    Patient Name: Serg Nagel  MRN: [id number]  CSN: [id number]  YOB: 1950    Diagnosis:   CRC screening, hx colon polyps    EGD added - pt reports that he has this approved by insurance, he understands that this as not been confirmed by GI staff/ lab.  He would like to go ahead with the EGD but understands he may be responsible for costs if insurance did not approve.    Indication, GERD and weight loss.       Prescriptions Prior to Admission[1]  Current Facility-Administered Medications   Medication Dose Route Frequency    lactated ringers infusion   Intravenous Continuous       Allergies: Allergies[2]    Past Medical History:    Arrhythmia    Congestive heart disease (HCC)    CVA (cerebral infarction)    Essential hypertension    High blood pressure    High cholesterol    History of blood clot in brain    Other and unspecified hyperlipidemia    Visual impairment     Past Surgical History:   Procedure Laterality Date    Cardiac defibrillator placement      Cardiac pacemaker placement      Colonoscopy N/A 04/08/2022    Procedure: COLONOSCOPY;  Surgeon: Handy Dinero MD;  Location: [facility] ENDOSCOPY    Hernia surgery Bilateral 01/07/2015    inguinal      Family History   Problem Relation Age of Onset    Lipids Mother     Hypertension Mother     Heart Disorder Father     Diabetes Neg     Cancer Neg     Glaucoma Neg      Social History     Tobacco Use    Smoking status: Never    Smokeless tobacco: Never    Tobacco comments:     never smoker   Substance Use Topics    Alcohol use: Yes     Alcohol/week: 1.0 standard drink of alcohol     Types: 1 Cans of beer per week       SYSTEM Check if Review is Normal Check if Physical Exam is Normal If not normal, please explain:   HEENT [x ] [ x]    NECK & BACK [x ] [x ]    HEART [x ] [ x]    LUNGS [x ] [ x]    ABDOMEN [x ] [x ]    UROGENITAL [ ] [ ]    EXTREMITIES [x ] [x ]    OTHER        [ x ] I have discussed the risks and  benefits and alternatives with the patient/family.  They understand and agree to proceed with plan of care.  [ x ] I have reviewed the History and Physical done within the last 30 days.  Any changes noted above.    Handy Dinero MD  12/30/2024  9:21 AM         [1]   Medications Prior to Admission   Medication Sig Dispense Refill Last Dose/Taking    LISINOPRIL 10 MG Oral Tab TAKE 1 TABLET BY MOUTH EVERY DAY 90 tablet 0 12/26/2024    XARELTO 20 MG Oral Tab Take 1 tablet (20 mg total) by mouth daily with food.   12/27/2024   [2]   Allergies  Allergen Reactions    Seasonal

## 2024-12-30 NOTE — TELEPHONE ENCOUNTER
Health Maintenance Updated.    5 year colonoscopy recall entered into patient outreach in [facility].  Next colonoscopy will be due 12/30/2029.    Patient viewed below result note in MyChart:  Seen by patient Serg Nagel on 12/30/2024  3:51 PM

## 2024-12-30 NOTE — OPERATIVE REPORT
[facility] Endoscopy Report  Date of procedure-December 30, 2024    Preoperative Diagnosis:  -Colorectal can screening  -History colon polyps  -GERD/globus  -Weight loss    Postoperative Diagnosis:  -Colon polyps x 2  -Diverticulosis  -Internal hemorrhoids  -Gastritis  -Hiatal hernia    Procedure:    Colonoscopy   Esophagogastroduodenoscopy       Surgeon:  Handy Dinero M.D.    Anesthesia:  MAC     Technique:  After informed consent, the patient was placed in the left lateral recumbent position.  Digital rectal examination revealed no palpable intraluminal abnormalities.  An Olympus variable stiffness 190 series HD colonoscope was inserted into the rectum and advanced under direct vision by following the lumen to the cecum.  The colon was examined upon withdrawal in the left lateral position.    Following colonoscopy, an Olympus adult HD gastroscope was inserted into the hypopharynx and advanced under direct vision into the esophagus, stomach and duodenum.  The endoscope was withdrawn to the stomach where retroflexion of the annulus, body, cardia and fundus was performed.  The instrument was straightened, insufflated air and fluid were suctioned and the endoscope was withdrawn.  The procedures were well tolerated without immediate complication.    Findings:  The preparation of the colon was good.  The terminal ileum was examined for 4 cm and visually normal.  The ileocecal valve was well preserved. The visualized colonic mucosa from the cecum to the anal verge was normal with an intact vascular pattern.    Colon polyps x 2 removed as follows;  -Ascending x 1, sessile 4 mm in size and cold snare removed.  -Rectum x 1, diminutive removed by cold forceps technique.  Both polypectomy sites inspected and found to be free of bleeding specimens retrieved and sent for analysis.    Diverticulosis located in the sigmoid colon, no diverticulitis.    Small internal hemorrhoids noted on retroflexed view.    The  esophagus was normal.  The GE junction and diaphragmatic impression were at 40 and 42 cm for 2 cm sliding-type hiatal hernia.  The stomach distended appropriately with insufflated air.  The mucosa of the stomach subtle gastric erythema, biopsies taken.  The duodenal bulb and post bulbar regions were normal.    Estimated blood loss-insignificant  Specimens-see above    Impression:  -Colon polyps x 2  -Diverticulosis  -Internal hemorrhoids  -Gastritis  -Hiatal hernia    Recommendations:  - Post polypectomy instructions given  - Repeat colonoscopy in 5 years  - High fiber diet for diverticular disease  - Symptomatic treatment of hemorrhoids  - Follow up on upper GI biopsies          Handy Dinero MD  12/30/2024  9:57 AM

## 2025-01-15 RX ORDER — LISINOPRIL 10 MG/1
10 TABLET ORAL DAILY
Qty: 90 TABLET | Refills: 0 | Status: SHIPPED | OUTPATIENT
Start: 2025-01-15

## 2025-04-01 ENCOUNTER — OFFICE VISIT (OUTPATIENT)
Dept: INTERNAL MEDICINE CLINIC | Facility: CLINIC | Age: 75
End: 2025-04-01
Payer: MEDICARE

## 2025-04-01 VITALS
HEIGHT: 70 IN | BODY MASS INDEX: 21.47 KG/M2 | DIASTOLIC BLOOD PRESSURE: 80 MMHG | WEIGHT: 150 LBS | SYSTOLIC BLOOD PRESSURE: 128 MMHG

## 2025-04-01 DIAGNOSIS — Z12.5 PROSTATE CANCER SCREENING: ICD-10-CM

## 2025-04-01 DIAGNOSIS — I42.0 DILATED CARDIOMYOPATHY (HCC): ICD-10-CM

## 2025-04-01 DIAGNOSIS — Z00.00 MEDICARE ANNUAL WELLNESS VISIT, SUBSEQUENT: Primary | ICD-10-CM

## 2025-04-01 DIAGNOSIS — M25.562 CHRONIC ARTHRALGIAS OF KNEES AND HIPS: ICD-10-CM

## 2025-04-01 DIAGNOSIS — M25.551 CHRONIC ARTHRALGIAS OF KNEES AND HIPS: ICD-10-CM

## 2025-04-01 DIAGNOSIS — M25.561 CHRONIC ARTHRALGIAS OF KNEES AND HIPS: ICD-10-CM

## 2025-04-01 DIAGNOSIS — G89.29 CHRONIC ARTHRALGIAS OF KNEES AND HIPS: ICD-10-CM

## 2025-04-01 DIAGNOSIS — K44.9 HIATAL HERNIA: ICD-10-CM

## 2025-04-01 DIAGNOSIS — M25.552 CHRONIC ARTHRALGIAS OF KNEES AND HIPS: ICD-10-CM

## 2025-04-01 DIAGNOSIS — I10 HYPERTENSION, UNSPECIFIED TYPE: ICD-10-CM

## 2025-04-01 PROCEDURE — 3074F SYST BP LT 130 MM HG: CPT | Performed by: INTERNAL MEDICINE

## 2025-04-01 PROCEDURE — 1125F AMNT PAIN NOTED PAIN PRSNT: CPT | Performed by: INTERNAL MEDICINE

## 2025-04-01 PROCEDURE — 3008F BODY MASS INDEX DOCD: CPT | Performed by: INTERNAL MEDICINE

## 2025-04-01 PROCEDURE — 3079F DIAST BP 80-89 MM HG: CPT | Performed by: INTERNAL MEDICINE

## 2025-04-01 PROCEDURE — 96160 PT-FOCUSED HLTH RISK ASSMT: CPT | Performed by: INTERNAL MEDICINE

## 2025-04-01 PROCEDURE — G0439 PPPS, SUBSEQ VISIT: HCPCS | Performed by: INTERNAL MEDICINE

## 2025-04-01 PROCEDURE — 1170F FXNL STATUS ASSESSED: CPT | Performed by: INTERNAL MEDICINE

## 2025-04-01 RX ORDER — OMEPRAZOLE 20 MG/1
20 CAPSULE, DELAYED RELEASE ORAL
Qty: 30 CAPSULE | Refills: 1 | Status: SHIPPED | OUTPATIENT
Start: 2025-04-01

## 2025-04-01 NOTE — PROGRESS NOTES
Subjective:   Serg Nagel is a 74 year old male who presents for Physical     Patient here for a Ma supervisit and fu on cardiomyopathy , Hiatal hernia and generalized joint pains.  Had recent Gi work up showing hiatal hernia.  History/Other:    Chief Complaint Reviewed and Verified  No Further Nursing Notes to   Review  Problem List Reviewed         Tobacco: non smoker  HPI:   Serg Nagel is a 74 year old male who presents for a MA Supervisit.    Patient Active Problem List   Diagnosis    Hyperlipidemia    Atrial flutter (HCC)    Cardiomyopathy (HCC)    Long term (current) use of anticoagulants    Chest pressure    Intermittent lightheadedness    Presence of single chamber implantable cardioverter-defibrillator (ICD)    S/P ablation of atrial fibrillation    S/P AV (atrioventricular) leonela ablation    Sensorineural hearing loss, bilateral    Tear of medial meniscus of right knee, current, initial encounter       General Health     In the past six months, have you lost more than 10 pounds without trying?: (Patient-Rptd) 2 - No    Has your appetite been poor?: (Patient-Rptd) No    Type of Diet: (Patient-Rptd) Balanced    How does the patient maintain a good energy level?: (Patient-Rptd) Appropriate Exercise    How would you describe your daily physical activity?: (Patient-Rptd) Light    How would you describe your current health state?: (Patient-Rptd) Poor    How do you maintain positive mental well-being?: (Patient-Rptd) Social Interaction         Have you had any immunizations at another office such as Influenza, Hepatitis B, Tetanus, or Pneumococcal?: (Patient-Rptd) Yes     Functional Ability     Bathing or Showering: (Patient-Rptd) Able without help    Toileting: (Patient-Rptd) Able without help    Dressing: (Patient-Rptd) Able without help    Eating: (Patient-Rptd) Able without help    Driving: (Patient-Rptd) Able without help    Preparing your meals: (Patient-Rptd) Able without  help    Managing money/bills: (Patient-Rptd) Able without help    Taking medications as prescribed: (Patient-Rptd) Able without help    Are you able to afford your medications?: (Patient-Rptd) Yes    Hearing Problems?: (Patient-Rptd) Yes     Functional Status     Hearing Problems?: (Patient-Rptd) Yes    Vision Problems? : (Patient-Rptd) Yes    Difficulty walking?: (Patient-Rptd) Yes    Difficulty dressing or bathing?: (Patient-Rptd) No    Problems with daily activities? : (Patient-Rptd) No    Memory Problems?: (Patient-Rptd) No      Fall/Risk Assessment                                                              Depression Screening (PHQ-2/PHQ-9): Over the LAST 2 WEEKS                      Advance Directives     Do you have a healthcare power of ?: (Patient-Rptd) No    Do you have a living will?: (Patient-Rptd) Yes   Was Medicare Assessment Questionnaire completed by patient and sent to HIM for scanning?Yes    Please go to \"Cognitive Assessment\" under Medicare Assessment section in Charting, test patient and document.  .    Then, refresh your progress note to see your input here.  Cognitive Assessment     What day of the week is this?: Correct    What month is it?: Correct    What year is it?: Correct    Recall \"Ball\": Correct    Recall \"Flag\": Correct    Recall \"Tree\": Correct      ALLERGIES:   Allergies[1]    CURRENT MEDICATIONS:   Current Outpatient Medications   Medication Sig Dispense Refill    omeprazole 20 MG Oral Capsule Delayed Release Take 1 capsule (20 mg total) by mouth every morning before breakfast. 30 capsule 1    LISINOPRIL 10 MG Oral Tab TAKE 1 TABLET BY MOUTH EVERY DAY 90 tablet 0    XARELTO 20 MG Oral Tab Take 1 tablet (20 mg total) by mouth daily with food.        MEDICAL INFORMATION:   Past Medical History:    Arrhythmia    Congestive heart disease (HCC)    CVA (cerebral infarction)    Essential hypertension    High blood pressure    High cholesterol    History of blood clot in brain     Other and unspecified hyperlipidemia    Visual impairment      Past Surgical History:   Procedure Laterality Date    Cardiac defibrillator placement      Cardiac pacemaker placement      Colonoscopy N/A 04/08/2022    Procedure: COLONOSCOPY;  Surgeon: Handy Dinero MD;  Location: [facility] ENDOSCOPY    Colonoscopy N/A 12/30/2024    Dr. Dinero; polyps and hemorrhoids    Colonoscopy N/A 12/30/2024    Procedure: COLONOSCOPY;  Surgeon: Handy Dinero MD;  Location: [facility] ENDOSCOPY    Egd N/A 12/30/2024    Dr. Dinero;    Hernia surgery Bilateral 01/07/2015    inguinal       Family History   Problem Relation Age of Onset    Lipids Mother     Hypertension Mother     Heart Disorder Father     Diabetes Neg     Cancer Neg     Glaucoma Neg       SOCIAL HISTORY:   Social History     Socioeconomic History    Marital status:    Tobacco Use    Smoking status: Never    Smokeless tobacco: Never    Tobacco comments:     never smoker   Vaping Use    Vaping status: Never Used   Substance and Sexual Activity    Alcohol use: Yes     Alcohol/week: 1.0 standard drink of alcohol     Types: 1 Cans of beer per week    Drug use: No   Other Topics Concern    Caffeine Concern Yes     Comment: 1 CUP COFFEE     Occ: retired : yes      REVIEW OF SYSTEMS:   GENERAL: feels well otherwise  SKIN: denies any unusual skin lesions  EYES: denies blurred vision or double vision bifocals  HEENT: denies nasal congestion, sinus pain or ST  LUNGS: denies shortness of breath with exertion  CARDIOVASCULAR: denies chest pain on exertion  GI: denies abdominal pain, denies heartburn  : 0 per night nocturia, no complaint of urinary incontinence  MUSCULOSKELETAL: denies back pain diffuse joint pains  NEURO: denies headaches  PSYCHE: denies depression or anxiety  HEMATOLOGIC: denies hx of anemia  ENDOCRINE: denies thyroid history  ALL/ASTHMA: denies hx of allergy or asthma    EXAM:   /80   Ht 5' 10\" (1.778 m)   Wt 150 lb (68 kg)   BMI 21.52 kg/m²       >   Wt Readings from Last 6 Encounters:   04/01/25 150 lb (68 kg)   12/23/24 149 lb (67.6 kg)   11/26/24 149 lb (67.6 kg)   05/16/24 147 lb 12.8 oz (67 kg)   03/29/24 149 lb (67.6 kg)   03/22/24 144 lb (65.3 kg)       >   BP Readings from Last 3 Encounters:   04/01/25 128/80   12/30/24 133/67   11/26/24 120/70       GENERAL: well developed, well nourished, in no apparent distress  SKIN: no rashes, no suspicious lesions  HEENT: atraumatic, normocephalic, ears and throat are clear  Hearing assessed via: Whispered Voice slightly diminished  EYES: PERRLA, EOMI, conjunctiva are clear              NECK: supple, no adenopathy, no bruits  CHEST: no chest tenderness  BREAST: no masses or discharge no gynecomastia  LUNGS: clear to auscultation  CARDIO: RRR without murmur  GI: good BS's, no masses, HSM or tenderness no joint deformity  : deferred  RECTAL deferred  MUSCULOSKELETAL: back is not tender, FROM of the back  EXTREMITIES: no cyanosis, clubbing or edema.  NEURO: Oriented times three, cranial nerves are intact, motor and sensory are grossly intact    ASSESSMENT AND OTHER RELEVANT CHRONIC CONDITIONS:   Serg Nagel is a 74 year old male who presents for a Medicare Assessment.     PLAN SUMMARY:   Diagnoses and all orders for this visit:    Medicare annual wellness visit, subsequent    Dilated cardiomyopathy (HCC)    Hiatal hernia    Hypertension, unspecified type    Other orders  -     omeprazole 20 MG Oral Capsule Delayed Release; Take 1 capsule (20 mg total) by mouth every morning before breakfast.         The patient indicates understanding of these issues and agrees to the plan.  The patient is asked to return in 12 for fu       PREVENTATIVE SERVICES  INDICATIONS AND SCHEDULE Internal Lab or Procedure External Lab or Procedure   Diabetes Screening      HbgA1C   Annually GLYCOHEMOGLOBIN (HgA1c) (L) (%)   Date Value   06/06/2015 5.8         No data to display                Fasting Blood Sugar  (FSB)Annually No results found for: \"GLUCOSE\"    Cardiovascular Disease Screening     LDL Annually LDL Cholesterol (mg/dL)   Date Value   05/18/2024 96        EKG One Time     Colorectal Cancer Screening      Colonoscopy Screen every 10 years Health Maintenance   Topic Date Due    Colorectal Cancer Screening  12/30/2029    Update Health Maintenance if applicable    Flex Sigmoidoscopy Screen every 10 years No results found for this or any previous visit.      No data to display                 Fecal Occult Blood Annually No results found for: \"FOB\"      No data to display                Glaucoma Screening      Ophthalmology Visit Annually     Prostate Cancer Screening      PSA  Annually There are no preventive care reminders to display for this patient.  Update Health Maintenance if applicable   Immunizations      Influenza No orders found for this or any previous visit. Update Immunization Activity if applicable    Pneumococcal Orders placed or performed in visit on 08/28/17    Pneumococcal (Prevnar 13) (44265) (DX V03.82/Z23)    Update Immunization Activity if applicable    Hepatitis B No orders found for this or any previous visit. Update Immunization Activity if applicable    Tetanus No orders found for this or any previous visit. Update Immunization Activity if applicable        SPECIFIC DISEASE MONITORING Internal Lab or Procedure External Lab or Procedure   Annual Monitoring of Persistent     Medications (ACE/ARB, digoxin diuretics, anticonvulsants.)    Potassium  Annually Potassium (mmol/L)   Date Value   05/18/2024 5.2 (H)     POTASSIUM (P) (mmol/L)   Date Value   01/23/2016 5.4 (H)         No data to display                Creatinine  Annually Creatinine (mg/dL)   Date Value   05/18/2024 0.96         No data to display                BUN  Annually BUN (mg/dL)   Date Value   05/18/2024 27 (H)         No data to display                 Drug Serum Conc  Annually No results found for: \"DIGOXIN\", \"DIG\", \"VALP\"       No data to display                Diabetes      HgbA1C  Annually GLYCOHEMOGLOBIN (HgA1c) (L) (%)   Date Value   06/06/2015 5.8         No data to display                Creat/alb ratio  Annually      LDL  Annually LDL Cholesterol (mg/dL)   Date Value   05/18/2024 96         No data to display                 Dilated Eye exam  Annually      No data to display                   No data to display                COPD      Spirometry Testing Annually No results found for this or any previous visit.      No data to display                    SUGGESTED VACCINATIONS - Influenza, Pneumococcal, Zoster, Tetanus     Immunization History   Administered Date(s) Administered    Covid-19 Vaccine Pfizer 30 mcg/0.3 ml 07/28/2021, 08/18/2021   Deferred Date(s) Deferred    FLUAD High Dose 65 yr and older (00246) 10/18/2017, 09/26/2018    Pneumococcal (Prevnar 13) 10/18/2017                Current Outpatient Medications   Medication Sig Dispense Refill    omeprazole 20 MG Oral Capsule Delayed Release Take 1 capsule (20 mg total) by mouth every morning before breakfast. 30 capsule 1    LISINOPRIL 10 MG Oral Tab TAKE 1 TABLET BY MOUTH EVERY DAY 90 tablet 0    XARELTO 20 MG Oral Tab Take 1 tablet (20 mg total) by mouth daily with food.           Review of Systems:  Review of Systems      Objective:   /80   Ht 5' 10\" (1.778 m)   Wt 150 lb (68 kg)   BMI 21.52 kg/m²  Estimated body mass index is 21.52 kg/m² as calculated from the following:    Height as of this encounter: 5' 10\" (1.778 m).    Weight as of this encounter: 150 lb (68 kg).  Physical Exam      Assessment & Plan:   1. Medicare annual wellness visit, subsequent (Primary)  check fasting labs  2. Dilated cardiomyopathy (HCC) on Lisinopril rx  3. Hiatal hernia Trial of Omeprazole  4. Hypertension, unspecified type controlled to goal  Other orders  -     Omeprazole; Take 1 capsule (20 mg total) by mouth every morning before breakfast.  Dispense: 30 capsule; Refill:  1        No follow-ups on file.    Tara Rodriguez MD, 4/1/2025, 2:46 PM        [1]   Allergies  Allergen Reactions    Seasonal       I have fibroids so I have pain and heavy bleeding

## 2025-04-03 ENCOUNTER — LAB ENCOUNTER (OUTPATIENT)
Dept: LAB | Age: 75
End: 2025-04-03
Attending: INTERNAL MEDICINE
Payer: MEDICARE

## 2025-04-03 DIAGNOSIS — Z12.5 PROSTATE CANCER SCREENING: ICD-10-CM

## 2025-04-03 LAB
ALBUMIN SERPL-MCNC: 4 G/DL (ref 3.2–4.8)
ALBUMIN/GLOB SERPL: 1.4 {RATIO} (ref 1–2)
ALP LIVER SERPL-CCNC: 71 U/L
ALT SERPL-CCNC: 7 U/L
ANION GAP SERPL CALC-SCNC: 5 MMOL/L (ref 0–18)
AST SERPL-CCNC: 19 U/L (ref ?–34)
BASOPHILS # BLD AUTO: 0.04 X10(3) UL (ref 0–0.2)
BASOPHILS NFR BLD AUTO: 0.4 %
BILIRUB SERPL-MCNC: 1.1 MG/DL (ref 0.2–1.1)
BUN BLD-MCNC: 22 MG/DL (ref 9–23)
BUN/CREAT SERPL: 18.8 (ref 10–20)
CALCIUM BLD-MCNC: 9.6 MG/DL (ref 8.7–10.4)
CHLORIDE SERPL-SCNC: 106 MMOL/L (ref 98–112)
CHOLEST SERPL-MCNC: 138 MG/DL (ref ?–200)
CO2 SERPL-SCNC: 28 MMOL/L (ref 21–32)
COMPLEXED PSA SERPL-MCNC: 2.1 NG/ML (ref ?–4)
CREAT BLD-MCNC: 1.17 MG/DL
DEPRECATED RDW RBC AUTO: 51.2 FL (ref 35.1–46.3)
EGFRCR SERPLBLD CKD-EPI 2021: 65 ML/MIN/1.73M2 (ref 60–?)
EOSINOPHIL # BLD AUTO: 0.49 X10(3) UL (ref 0–0.7)
EOSINOPHIL NFR BLD AUTO: 5 %
ERYTHROCYTE [DISTWIDTH] IN BLOOD BY AUTOMATED COUNT: 15.1 % (ref 11–15)
FASTING PATIENT LIPID ANSWER: YES
FASTING STATUS PATIENT QL REPORTED: YES
GLOBULIN PLAS-MCNC: 2.9 G/DL (ref 2–3.5)
GLUCOSE BLD-MCNC: 95 MG/DL (ref 70–99)
HCT VFR BLD AUTO: 48.6 %
HDLC SERPL-MCNC: 33 MG/DL (ref 40–59)
HGB BLD-MCNC: 15.1 G/DL
IMM GRANULOCYTES # BLD AUTO: 0.15 X10(3) UL (ref 0–1)
IMM GRANULOCYTES NFR BLD: 1.5 %
LDLC SERPL CALC-MCNC: 91 MG/DL (ref ?–100)
LYMPHOCYTES # BLD AUTO: 1.33 X10(3) UL (ref 1–4)
LYMPHOCYTES NFR BLD AUTO: 13.6 %
MCH RBC QN AUTO: 28.7 PG (ref 26–34)
MCHC RBC AUTO-ENTMCNC: 31.1 G/DL (ref 31–37)
MCV RBC AUTO: 92.4 FL
MONOCYTES # BLD AUTO: 0.54 X10(3) UL (ref 0.1–1)
MONOCYTES NFR BLD AUTO: 5.5 %
NEUTROPHILS # BLD AUTO: 7.22 X10 (3) UL (ref 1.5–7.7)
NEUTROPHILS # BLD AUTO: 7.22 X10(3) UL (ref 1.5–7.7)
NEUTROPHILS NFR BLD AUTO: 74 %
NONHDLC SERPL-MCNC: 105 MG/DL (ref ?–130)
OSMOLALITY SERPL CALC.SUM OF ELEC: 291 MOSM/KG (ref 275–295)
PLATELET # BLD AUTO: 300 10(3)UL (ref 150–450)
POTASSIUM SERPL-SCNC: 4.7 MMOL/L (ref 3.5–5.1)
PROT SERPL-MCNC: 6.9 G/DL (ref 5.7–8.2)
RBC # BLD AUTO: 5.26 X10(6)UL
SODIUM SERPL-SCNC: 139 MMOL/L (ref 136–145)
TRIGL SERPL-MCNC: 72 MG/DL (ref 30–149)
VLDLC SERPL CALC-MCNC: 12 MG/DL (ref 0–30)
WBC # BLD AUTO: 9.8 X10(3) UL (ref 4–11)

## 2025-04-03 PROCEDURE — 36415 COLL VENOUS BLD VENIPUNCTURE: CPT

## 2025-04-03 PROCEDURE — 80061 LIPID PANEL: CPT | Performed by: INTERNAL MEDICINE

## 2025-04-03 PROCEDURE — 85025 COMPLETE CBC W/AUTO DIFF WBC: CPT | Performed by: INTERNAL MEDICINE

## 2025-04-03 PROCEDURE — 80053 COMPREHEN METABOLIC PANEL: CPT | Performed by: INTERNAL MEDICINE

## 2025-04-19 RX ORDER — LISINOPRIL 10 MG/1
10 TABLET ORAL DAILY
Qty: 90 TABLET | Refills: 0 | Status: SHIPPED | OUTPATIENT
Start: 2025-04-19

## 2025-07-21 RX ORDER — LISINOPRIL 10 MG/1
10 TABLET ORAL DAILY
Qty: 90 TABLET | Refills: 3 | Status: SHIPPED | OUTPATIENT
Start: 2025-07-21

## 2025-08-02 ENCOUNTER — LAB ENCOUNTER (OUTPATIENT)
Dept: LAB | Age: 75
End: 2025-08-02
Attending: INTERNAL MEDICINE

## 2025-08-02 PROCEDURE — 36415 COLL VENOUS BLD VENIPUNCTURE: CPT | Performed by: INTERNAL MEDICINE

## 2025-08-02 PROCEDURE — 80053 COMPREHEN METABOLIC PANEL: CPT | Performed by: INTERNAL MEDICINE

## 2025-08-02 PROCEDURE — 85025 COMPLETE CBC W/AUTO DIFF WBC: CPT | Performed by: INTERNAL MEDICINE

## 2025-08-02 PROCEDURE — 86140 C-REACTIVE PROTEIN: CPT | Performed by: INTERNAL MEDICINE

## 2025-08-06 ENCOUNTER — OFFICE VISIT (OUTPATIENT)
Age: 75
End: 2025-08-06

## 2025-08-06 VITALS
HEIGHT: 70 IN | WEIGHT: 149 LBS | DIASTOLIC BLOOD PRESSURE: 70 MMHG | HEART RATE: 60 BPM | BODY MASS INDEX: 21.33 KG/M2 | SYSTOLIC BLOOD PRESSURE: 137 MMHG

## 2025-08-06 DIAGNOSIS — M25.50 POLYARTHRALGIA: Primary | ICD-10-CM

## 2025-08-06 DIAGNOSIS — M25.532 BILATERAL WRIST PAIN: ICD-10-CM

## 2025-08-06 DIAGNOSIS — M25.531 BILATERAL WRIST PAIN: ICD-10-CM

## 2025-08-06 DIAGNOSIS — M06.00 SERONEGATIVE RHEUMATOID ARTHRITIS (HCC): ICD-10-CM

## 2025-08-06 PROCEDURE — 1160F RVW MEDS BY RX/DR IN RCRD: CPT | Performed by: INTERNAL MEDICINE

## 2025-08-06 PROCEDURE — 3075F SYST BP GE 130 - 139MM HG: CPT | Performed by: INTERNAL MEDICINE

## 2025-08-06 PROCEDURE — 1125F AMNT PAIN NOTED PAIN PRSNT: CPT | Performed by: INTERNAL MEDICINE

## 2025-08-06 PROCEDURE — 99214 OFFICE O/P EST MOD 30 MIN: CPT | Performed by: INTERNAL MEDICINE

## 2025-08-06 PROCEDURE — 1159F MED LIST DOCD IN RCRD: CPT | Performed by: INTERNAL MEDICINE

## 2025-08-06 PROCEDURE — 3008F BODY MASS INDEX DOCD: CPT | Performed by: INTERNAL MEDICINE

## 2025-08-06 PROCEDURE — 3078F DIAST BP <80 MM HG: CPT | Performed by: INTERNAL MEDICINE

## 2025-08-06 RX ORDER — TORSEMIDE 5 MG/1
2.5 TABLET ORAL DAILY
COMMUNITY

## 2025-08-06 RX ORDER — DIPHENHYDRAMINE HYDROCHLORIDE 25 MG/1
25 CAPSULE ORAL DAILY
COMMUNITY

## 2025-08-06 RX ORDER — PANTOPRAZOLE SODIUM 20 MG/1
20 TABLET, DELAYED RELEASE ORAL
COMMUNITY

## 2025-08-06 RX ORDER — METHOTREXATE 2.5 MG/1
10 TABLET ORAL WEEKLY
COMMUNITY

## 2025-08-06 RX ORDER — PREDNISOLONE 5 MG/1
5 TABLET ORAL DAILY
COMMUNITY

## 2025-08-21 ENCOUNTER — PATIENT MESSAGE (OUTPATIENT)
Age: 75
End: 2025-08-21

## 2025-08-21 DIAGNOSIS — G89.29 CHRONIC PAIN OF BOTH ANKLES: Primary | ICD-10-CM

## 2025-08-21 DIAGNOSIS — M25.572 CHRONIC PAIN OF BOTH ANKLES: Primary | ICD-10-CM

## 2025-08-21 DIAGNOSIS — M25.571 CHRONIC PAIN OF BOTH ANKLES: Primary | ICD-10-CM

## (undated) DEVICE — 60 ML SYRINGE REGULAR TIP: Brand: MONOJECT

## (undated) DEVICE — V2 SPECIMEN COLLECTION TRAY: Brand: NEPTUNE

## (undated) DEVICE — MEDI-VAC NON-CONDUCTIVE SUCTION TUBING 6MM X 1.8M (6FT.) L: Brand: CARDINAL HEALTH

## (undated) DEVICE — CLIP LGT 11MM OPEN 2.8MM 235CM

## (undated) DEVICE — KIT ENDO ORCAPOD 160/180/190

## (undated) DEVICE — 35 ML SYRINGE REGULAR TIP: Brand: MONOJECT

## (undated) DEVICE — V2 SPECIMEN COLLECTION MANIFOLD KIT: Brand: NEPTUNE

## (undated) DEVICE — GIJAW SINGLE-USE BIOPSY FORCEPS WITH NEEDLE: Brand: GIJAW

## (undated) DEVICE — KIT CLEAN ENDOKIT 1.1OZ GOWNX2

## (undated) DEVICE — LASSO POLYPECTOMY SNARE: Brand: LASSO

## (undated) DEVICE — LINE MNTR ADLT SET O2 INTMD

## (undated) DEVICE — CO2 CANNULA,SSOFT,ADLT,7O2,4CO2,FEMALE: Brand: MEDLINE

## (undated) DEVICE — SNARE OPTMZ PLPCTM TRP

## (undated) DEVICE — SNARE ENDOSCOPIC 10MM ROUND

## (undated) NOTE — LETTER
No referring provider defined for this encounter. 07/10/19        Patient: Devon Webb   YOB: 1950   Date of Visit: 7/9/2019       Dear  Dr. Nona Andrade MD,      Thank you for referring Devon Webb to my practice.

## (undated) NOTE — LETTER
[facility]  Administration of Anesthesia  1. I, Delmi Cannon, or _________________________________ acting on his behalf, (Patient) (Dependent/Representative) request to receive anesthesia for my pending procedure/operation/treatment. A physician (anesthesiologist) alone or an anesthesiologist working with a nurse anesthetist may administer my anesthesia. 2. I understand that my anesthesiologist is not an employee or agent of the hospital, but is an independent medical practitioner who has been permitted to use its facilities for the care and treatment of his/her patients. 3. I acknowledge that a physician from [facility] or their designate(s), recommended anesthesia for me using her/his medical judgment. The type(s) of anesthesia I may receive include:                a) General Anesthesia, b) Spinal/Epidural Anesthesia, c) Regional Anesthesia or d) Monitored Anesthesia Care. 4. If my spinal, regional or monitored anesthesia care (local) is not satisfactory for my comfort, or if my medical condition requires, I consent to the administration of general anesthesia. 5. I am aware that the practice of anesthesiology is not an exact science and that some foreseeable risks or consequences may occur. Some common risks/consequences include sore throat and hoarseness, nausea and vomiting, muscle soreness, backache, damage to the mouth/teeth/vocal cords and eye injury. I understand that more rare but serious potential risks of anesthesia include blood pressure changes, drug reactions, cardiac arrest, brain damage, paralysis or death. These risks apply to whether I have general, spinal/epidural, regional or monitored anesthesia care. 6. OBSTETRIC PATIENTS: Specific risks/consequences of spinal/epidural anesthesia may include itching, low blood pressure, difficulty urinating, slowing of the baby's heart rate and headache.  Rare risks include infections, high spinal block, spinal bleeding, seizure, cardiac arrest and death. 7. AWARENESS: I understand that it is possible (but unlikely) to have explicit memory of events from the operating room while under general anesthesia. 8. ELECTROCONVULSIVE THERAPY PATIENTS: This consent serves for all treatments in a single course of therapy. 9. I understand that I must inform my anesthesiologist when I last ate and/or drank to minimize the risk of anesthesia. 10. If I am pregnant, or may pregnant, I understand that elective surgery should be postponed until after the baby is born. Anesthetics cross the placenta and may temporarily anesthetize the baby. Although fetal complications of anesthesia during pregnancy are rare, they may include birth defects, premature labor, brain damage and death. 11. I certify that I informed the anesthesiologist, to the best of my ability, about medication I take including blood thinners, anticoagulants, herbal remedies, narcotics and recreational drugs (e.g. cocaine, marijuana, PCP). Failure to inform my anesthesiologist about these medicines may increase my risk of anesthetic complications. The nature and purpose of my anesthetic management was explained to me. I had the opportunity to ask questions and the answers and information provided meet my satisfaction.   I retain the right to withdraw this consent at any time prior to the administration of said anesthetic.    ___________________________________________________           _____________________________________________________  Patient Signature                                                                                      Witness Signature                ___________________________________________________           _____________________________________________________  Date/Time                                                                                               Responsible person in case of minor/ unconscious pt /Relationship    My signature below affirms that prior to the time of the procedure, I have explained to the patient and/or his/her guardian, the risks and benefits of undergoing anesthesia, as well as any reasonable alternatives.     ___________________________________________________            _____________________________________________________  Physician Signature                            Date/Time  Patient Name: Joce Arnold     : 10/24/1950     Printed: 2022      Medical Record #: [id number]                              Page 1 of 1    ----------ANESTHESIA CONSENT----------

## (undated) NOTE — LETTER
[facility]  [address], [place], [place]    Authorization for Surgical Operation and Procedure                               I hereby authorize Handy Dinero MD, my physician and his/her assistants (if applicable), which may include medical students, residents, and/or fellows, to perform the following surgical operation/ procedure and administer such anesthesia as may be determined necessary by my physician: Operation/Procedure name (s) COLONOSCOPY/ ESOPHAGOGASTRODUODENOSCOPY (EGD) on Serg Nagel   2.   I recognize that during the surgical operation/procedure, unforeseen conditions may necessitate additional or different procedures than those listed above.  I, therefore, further authorize and request that the above-named surgeon, assistants, or designees perform such procedures as are, in their judgment, necessary and desirable.    3.   My surgeon/physician has discussed prior to my surgery the potential benefits, risks and side effects of this procedure; the likelihood of achieving goals; and potential problems that might occur during recuperation.  They also discussed reasonable alternatives to the procedure, including risks, benefits, and side effects related to the alternatives and risks related to not receiving this procedure.  I have had all my questions answered and I acknowledge that no guarantee has been made as to the result that may be obtained.    4.   Should the need arise during my operation/procedure, which includes change of level of care prior to discharge, I also consent to the administration of blood and/or blood products.  Further, I understand that despite careful testing and screening of blood or blood products by collecting agencies, I may still be subject to ill effects as a result of receiving a blood transfusion and/or blood products.  The following are some, but not all, of the potential risks that can occur: fever and allergic reactions, hemolytic  reactions, transmission of diseases such as Hepatitis, AIDS and Cytomegalovirus (CMV) and fluid overload.  In the event that I wish to have an autologous transfusion of my own blood, or a directed donor transfusion, I will discuss this with my physician.  Check only if Refusing Blood or Blood Products  I understand refusal of blood or blood products as deemed necessary by my physician may have serious consequences to my condition to include possible death. I hereby assume responsibility for my refusal and release the hospital, its personnel, and my physicians from any responsibility for the consequences of my refusal.    o  Refuse   5.   I authorize the use of any specimen, organs, tissues, body parts or foreign objects that may be removed from my body during the operation/procedure for diagnosis, research or teaching purposes and their subsequent disposal by hospital authorities.  I also authorize the release of specimen test results and/or written reports to my treating physician on the hospital medical staff or other referring or consulting physicians involved in my care, at the discretion of the Pathologist or my treating physician.    6.   I consent to the photographing or videotaping of the operations or procedures to be performed, including appropriate portions of my body for medical, scientific, or educational purposes, provided my identity is not revealed by the pictures or by descriptive texts accompanying them.  If the procedure has been photographed/videotaped, the surgeon will obtain the original picture, image, videotape or CD.  The hospital will not be responsible for storage, release or maintenance of the picture, image, tape or CD.    7.   I consent to the presence of a  or observers in the operating room as deemed necessary by my physician or their designees.    8.   I recognize that in the event my procedure results in extended X-Ray/fluoroscopy time, I may develop a skin  reaction.    9. If I have a Do Not Attempt Resuscitation (DNAR) order in place, that status will be suspended while in the operating room, procedural suite, and during the recovery period unless otherwise explicitly stated by me (or a person authorized to consent on my behalf). The surgeon or my attending physician will determine when the applicable recovery period ends for purposes of reinstating the DNAR order.  10. Patients having a sterilization procedure: I understand that if the procedure is successful the results will be permanent and it will therefore be impossible for me to inseminate, conceive, or bear children.  I also understand that the procedure is intended to result in sterility, although the result has not been guaranteed.   11. I acknowledge that my physician has explained sedation/analgesia administration to me including the risk and benefits I consent to the administration of sedation/analgesia as may be necessary or desirable in the judgment of my physician.    I CERTIFY THAT I HAVE READ AND FULLY UNDERSTAND THE ABOVE CONSENT TO OPERATION and/or OTHER PROCEDURE.     ____________________________________  _________________________________        ______________________________  Signature of Patient    Signature of Responsible Person                Printed Name of Responsible Person                                      ____________________________________  _____________________________                ________________________________  Signature of Witness        Date  Time         Relationship to Patient    STATEMENT OF PHYSICIAN My signature below affirms that prior to the time of the procedure; I have explained to the patient and/or his/her legal representative, the risks and benefits involved in the proposed treatment and any reasonable alternative to the proposed treatment. I have also explained the risks and benefits involved in refusal of the proposed treatment and alternatives to the proposed  treatment and have answered the patient's questions. If I have a significant financial interest in a co-management agreement or a significant financial interest in any product or implant, or other significant relationship used in this procedure/surgery, I have disclosed this and had a discussion with my patient.     _____________________________________________________              _____________________________  (Signature of Physician)                                                                                         (Date)                                   (Time)  Patient Name: Serg Richmavisnhung      : 10/24/1950      Printed: 2024     Medical Record #: [id number]                                      Page 1 of 1

## (undated) NOTE — LETTER
[facility], [place]  Authorization for Invasive Procedures  1.  I hereby authorize Dr. Rena Dyer , my physician and whomever may be designated as the doctor's assistant, to perform the following operation and/or procedure:  Implantabl performed for the purposes of advancing medicine, science, and/or education, provided my identity is not revealed. If the procedure has been videotaped, the physician/surgeon will obtain the original videotape.  The hospital will not be responsible for stor My signature below affirms that prior to the time of the procedure, I have explained to the patient and/or his legal representative, the risks and benefits involved in the proposed treatment and any reasonable alternative to the proposed treatment.  I have

## (undated) NOTE — MR AVS SNAPSHOT
[address] [address]  [phone]               Thank you for choosing us for your health care visit with Vijay Bowden MD.  We are glad to serve you and happy to provide you with this summary This list is accurate as of: 1/19/17  2:44 PM.  Always use your most recent med list.                Warfarin Sodium 5 MG Tabs   Commonly known as:  COUMADIN                   Today's Orders     PSA Screen    Complete by:  Jan 19, 2017 (Approximate)    Ass authorization numbers or be assured that none are required. You can then schedule your appointment. Failure to obtain required authorization numbers can create reimbursement difficulties for you.       Assoc Dx:  Atrial flutter, unspecified type [I48.92]

## (undated) NOTE — LETTER
[facility]  Administration of Anesthesia  ISerg agree to be cared for by a physician anesthesiologist alone and/or with a nurse anesthetist, who is specially trained to monitor me and give me medicine to put me to sleep or keep me comfortable during my procedure    I understand that my anesthesiologist and/or anesthetist is not an employee or agent of [facility] or [employer]. He or she works for [facility]    As the patient asking for anesthesia services, I agree to:  Allow the anesthesiologist (anesthesia doctor) to give me medicine and do additional procedures as necessary. Some examples are: Starting or using an “IV” to give me medicine, fluids or blood during my procedure, and having a breathing tube placed to help me breathe when I’m asleep (intubation). In the event that my heart stops working properly, I understand that my anesthesiologist will make every effort to sustain my life, unless otherwise directed by [facility] Do Not Resuscitate documents.  Tell my anesthesia doctor before my procedure:  If I am pregnant.  The last time that I ate or drank.  iii. All of the medicines I take (including prescriptions, herbal supplements, and pills I can buy without a prescription (including street drugs/illegal medications). Failure to inform my anesthesiologist about these medicines may increase my risk of anesthetic complications.  iv.If I am allergic to anything or have had a reaction to anesthesia before.  I understand how the anesthesia medicine will help me (benefits).  I understand that with any type of anesthesia medicine there are risks:  The most common risks are: nausea, vomiting, sore throat, muscle soreness, damage to my eyes, mouth, or teeth (from breathing tube placement).  Rare risks include: remembering what happened during my procedure, allergic reactions to medications, injury to my airway, heart, lungs, vision, nerves, or  muscles and in extremely rare instances death.  My doctor has explained to me other choices available to me for my care (alternatives).  Pregnant Patients (“epidural”):  I understand that the risks of having an epidural (medicine given into my back to help control pain during labor), include itching, low blood pressure, difficulty urinating, headache or slowing of the baby’s heart. Very rare risks include infection, bleeding, seizure, irregular heart rhythms and nerve injury.  Regional Anesthesia (“spinal”, “epidural”, & “nerve blocks”):  I understand that rare but potential complications include headache, bleeding, infection, seizure, irregular heart rhythms, and nerve injury.    _____________________________________________________________________________  Patient (or Representative) Signature/Relationship to Patient  Date   Time    _____________________________________________________________________________   Name (if used)    Language/Organization   Time    _____________________________________________________________________________  Nurse Anesthetist Signature     Date   Time  _____________________________________________________________________________  Anesthesiologist Signature     Date   Time  I have discussed the procedure and information above with the patient (or patient’s representative) and answered their questions. The patient or their representative has agreed to have anesthesia services.    _____________________________________________________________________________  Witness        Date   Time  I have verified that the signature is that of the patient or patient’s representative, and that it was signed before the procedure  Patient Name: Serg Nagel     : 10/24/1950                 Printed: 2024 at 10:32 AM    Medical Record #: [id number]                                            Page 1 of 1  ----------ANESTHESIA CONSENT----------

## (undated) NOTE — LETTER
[facility], [place]  Authorization for Invasive Procedures  1.  I hereby authorize Dr. Gildardo Escoto , my physician and whomever may be designated as the doctor's assistant, to perform the following operation and/or procedure:  Electro-ph 5. I consent to the photographing of the operations or procedures to be performed for the purposes of advancing medicine, science, and/or education, provided my identity is not revealed.  If the procedure has been videotaped, the physician/surgeon will obta __________ Time: ___________    Statement of Physician  My signature below affirms that prior to the time of the procedure, I have explained to the patient and/or his legal representative, the risks and benefits involved in the proposed treatment and any r

## (undated) NOTE — LETTER
[address], [place]  Authorization for Invasive Procedures  1. I hereby authorize Dr. Bryson Pacheco , my physician and whomever may be designated as the doctor's assistant, to perform the following operation and/or procedure:  Colonoscopy on Shirley Rodas at [facility].    2. My physician has explained to me the nature and purpose of the operation or other procedure, possible alternative methods of treatment, the risks involved and the possibility of complications to me. I understand the probable consequences of declining the recommended procedure and the alternative methods of treatment. I acknowledge that no guarantee has been made as to the result that may be obtained. 3. I recognize that during the course of this operation or other procedure, unforeseen conditions may necessitate additional or different procedures than those listed above. I, therefore, further authorize and request that the above-named physician, his/her physician assistants, or designees perform such procedures as are, in his/her professional opinion, necessary and desirable. If I have a Do Not Attempt Resuscitation (DNAR) order in place, that status will be suspended while in the operating room, procedural suite, and during the recovery period unless otherwise explicitly stated by me (or a person authorized to consent on my behalf). The surgeon or my attending physician will determine when the applicable recovery period ends for purposes of reinstating the DNAR order. 4. Should the need arise during my operation or immediate post-operative period; I also consent to the administration of blood and/or blood products.  Further, I understand that despite careful testing and screening of blood and blood products, I may still be subject to ill effects as a result of recieving a blood transfusion an/or blood producst. The following are some, but not all, of the potential risks that can occur: fever and allergic reactions, hemolytic reactions, transmission of disease such as hepatitis, AIDS, cytomegalovirus (CMV), and flluid overload. In the event that I wish to have autologous transfusions of my own blood, or a directed donor transfusion, I will discuss this with my physician. 5. I consent to the photographing of the operations or procedures to be performed for the purposes of advancing medicine, science, and/or education, provided my identity is not revealed. If the procedure has been videotaped, the physician/surgeon will obtain the original videotape. The hospital will not be responsible for storage or maintenance of this tape. 6. I consent to the presence of a  or observer as deemed necessary by my physician or his designee. 7. Any tissues or organs removed in the operation or other procedure may be disposed of by and at the discretion of [facility].    8. I understand that the physician and his/her physician assistants may not be employees or agents of [facility], [facility], nor [facility], but are independent medical practitioners who have been permitted to use its facilities for the care and treatment of their patients. 9. Patients having a sterilization procedure: I understand that if the procedure is successful the results will be permanent and it will therefore be impossible for me to inseminate, conceive or bear children. I also understand that the procedure is intended to result in sterility, although the result has not been guaranteed. 10. I CERTIFY THAT I HAVE READ AND FULLY UNDERSTAND THE ABOVE CONSENT TO OPERATION and/or OTHER PROCEDURE. 11. I acknowledge that my physician has explained sedation/analgesia administration to me including the risks and benefits. I consent to the administration of sedation/analgesia as may be necessary or desirable in the judgment of my physician.    Signature of Patient:  ________________________________________________ Date: _________Time: _________    Responsible person in case of minor or unconscious: _____________________________Relationship: ____________     Witness Signature: ____________________________________________ Date: __________ Time: ___________    Statement of Physician  My signature below affirms that prior to the time of the procedure, I have explained to the patient and/or his legal representative, the risks and benefits involved in the proposed treatment and any reasonable alternative to the proposed treatment. I have also explained the risks and benefits involved in the refusal of the proposed treatment and have answered the patient's questions. If I have a significant financial interest in this procedure/surgery, I have disclosed this and had a discussion with my patient.     Signature of Physician:   ________________________________________Date: _________Time:_______ Patient Name: Tori Arce  : 10/24/1950   Printed: 2022    Medical Record #: [id number]

## (undated) NOTE — LETTER
[facility], [place]  Authorization for Invasive Procedures  1.  I hereby authorize Dr. Mirna Delgado , my physician and whomever may be designated as the doctor's assistant, to perform the following operation and/or procedure:  Implantabl performed for the purposes of advancing medicine, science, and/or education, provided my identity is not revealed. If the procedure has been videotaped, the physician/surgeon will obtain the original videotape.  The hospital will not be responsible for stor My signature below affirms that prior to the time of the procedure, I have explained to the patient and/or his legal representative, the risks and benefits involved in the proposed treatment and any reasonable alternative to the proposed treatment.  I have

## (undated) NOTE — LETTER
[facility]  [address], [place], [place]    Authorization for Surgical Operation and Procedure                               I hereby authorize Handy Dinero MD, my physician and his/her assistants (if applicable), which may include medical students, residents, and/or fellows, to perform the following surgical operation/ procedure and administer such anesthesia as may be determined necessary by my physician: Operation/Procedure name (s) COLONOSCOPY on Serg Nagel   2.   I recognize that during the surgical operation/procedure, unforeseen conditions may necessitate additional or different procedures than those listed above.  I, therefore, further authorize and request that the above-named surgeon, assistants, or designees perform such procedures as are, in their judgment, necessary and desirable.    3.   My surgeon/physician has discussed prior to my surgery the potential benefits, risks and side effects of this procedure; the likelihood of achieving goals; and potential problems that might occur during recuperation.  They also discussed reasonable alternatives to the procedure, including risks, benefits, and side effects related to the alternatives and risks related to not receiving this procedure.  I have had all my questions answered and I acknowledge that no guarantee has been made as to the result that may be obtained.    4.   Should the need arise during my operation/procedure, which includes change of level of care prior to discharge, I also consent to the administration of blood and/or blood products.  Further, I understand that despite careful testing and screening of blood or blood products by collecting agencies, I may still be subject to ill effects as a result of receiving a blood transfusion and/or blood products.  The following are some, but not all, of the potential risks that can occur: fever and allergic reactions, hemolytic reactions, transmission of diseases such  as Hepatitis, AIDS and Cytomegalovirus (CMV) and fluid overload.  In the event that I wish to have an autologous transfusion of my own blood, or a directed donor transfusion, I will discuss this with my physician.  Check only if Refusing Blood or Blood Products  I understand refusal of blood or blood products as deemed necessary by my physician may have serious consequences to my condition to include possible death. I hereby assume responsibility for my refusal and release the hospital, its personnel, and my physicians from any responsibility for the consequences of my refusal.    o  Refuse   5.   I authorize the use of any specimen, organs, tissues, body parts or foreign objects that may be removed from my body during the operation/procedure for diagnosis, research or teaching purposes and their subsequent disposal by hospital authorities.  I also authorize the release of specimen test results and/or written reports to my treating physician on the hospital medical staff or other referring or consulting physicians involved in my care, at the discretion of the Pathologist or my treating physician.    6.   I consent to the photographing or videotaping of the operations or procedures to be performed, including appropriate portions of my body for medical, scientific, or educational purposes, provided my identity is not revealed by the pictures or by descriptive texts accompanying them.  If the procedure has been photographed/videotaped, the surgeon will obtain the original picture, image, videotape or CD.  The hospital will not be responsible for storage, release or maintenance of the picture, image, tape or CD.    7.   I consent to the presence of a  or observers in the operating room as deemed necessary by my physician or their designees.    8.   I recognize that in the event my procedure results in extended X-Ray/fluoroscopy time, I may develop a skin reaction.    9. If I have a Do Not Attempt  Resuscitation (DNAR) order in place, that status will be suspended while in the operating room, procedural suite, and during the recovery period unless otherwise explicitly stated by me (or a person authorized to consent on my behalf). The surgeon or my attending physician will determine when the applicable recovery period ends for purposes of reinstating the DNAR order.  10. Patients having a sterilization procedure: I understand that if the procedure is successful the results will be permanent and it will therefore be impossible for me to inseminate, conceive, or bear children.  I also understand that the procedure is intended to result in sterility, although the result has not been guaranteed.   11. I acknowledge that my physician has explained sedation/analgesia administration to me including the risk and benefits I consent to the administration of sedation/analgesia as may be necessary or desirable in the judgment of my physician.    I CERTIFY THAT I HAVE READ AND FULLY UNDERSTAND THE ABOVE CONSENT TO OPERATION and/or OTHER PROCEDURE.     ____________________________________  _________________________________        ______________________________  Signature of Patient    Signature of Responsible Person                Printed Name of Responsible Person                                      ____________________________________  _____________________________                ________________________________  Signature of Witness        Date  Time         Relationship to Patient    STATEMENT OF PHYSICIAN My signature below affirms that prior to the time of the procedure; I have explained to the patient and/or his/her legal representative, the risks and benefits involved in the proposed treatment and any reasonable alternative to the proposed treatment. I have also explained the risks and benefits involved in refusal of the proposed treatment and alternatives to the proposed treatment and have answered the patient's  questions. If I have a significant financial interest in a co-management agreement or a significant financial interest in any product or implant, or other significant relationship used in this procedure/surgery, I have disclosed this and had a discussion with my patient.     _____________________________________________________              _____________________________  (Signature of Physician)                                                                                         (Date)                                   (Time)  Patient Name: Serg Nagel      : 10/24/1950      Printed: 2024     Medical Record #: [id number]                                      Page 1 of 1

## (undated) NOTE — ED AVS SNAPSHOT
Aftab Roberts   MRN: [id number]    Department:  [facility] Emergency Department   Date of Visit:  8/29/2018           Disclosure     Insurance plans vary and the physician(s) referred by the ER may not be covered by your plan.  Please con within the next three months to obtain basic health screening including reassessment of your blood pressure.     IF THERE IS ANY CHANGE OR WORSENING OF YOUR CONDITION, CALL YOUR PRIMARY CARE PHYSICIAN AT ONCE OR RETURN IMMEDIATELY TO THE EMERGENCY DEPARTMEN